# Patient Record
Sex: FEMALE | Race: WHITE | NOT HISPANIC OR LATINO | Employment: OTHER | URBAN - METROPOLITAN AREA
[De-identification: names, ages, dates, MRNs, and addresses within clinical notes are randomized per-mention and may not be internally consistent; named-entity substitution may affect disease eponyms.]

---

## 2017-04-10 DIAGNOSIS — M81.0 OSTEOPOROSIS: ICD-10-CM

## 2017-04-10 DIAGNOSIS — K21.9 GASTROESOPHAGEAL REFLUX DISEASE WITHOUT ESOPHAGITIS: ICD-10-CM

## 2017-04-10 DIAGNOSIS — I10 ESSENTIAL HYPERTENSION WITH GOAL BLOOD PRESSURE LESS THAN 130/80: ICD-10-CM

## 2017-04-10 DIAGNOSIS — E78.2 MIXED HYPERLIPIDEMIA: ICD-10-CM

## 2017-04-10 RX ORDER — LISINOPRIL 30 MG/1
30 TABLET ORAL DAILY
Qty: 90 TABLET | Refills: 0 | Status: SHIPPED | OUTPATIENT
Start: 2017-04-10 | End: 2017-07-05

## 2017-04-10 RX ORDER — ALENDRONATE SODIUM 70 MG/1
70 TABLET ORAL
Qty: 13 TABLET | Refills: 0 | Status: SHIPPED | OUTPATIENT
Start: 2017-04-10 | End: 2017-07-11

## 2017-04-10 RX ORDER — ROSUVASTATIN CALCIUM 10 MG/1
10 TABLET, COATED ORAL DAILY
Qty: 90 TABLET | Refills: 0 | Status: SHIPPED | OUTPATIENT
Start: 2017-04-10 | End: 2017-06-23

## 2017-04-10 NOTE — TELEPHONE ENCOUNTER
raloxifene    Last Written Prescription Date: 6/4/16  Last Fill Quantity: 90, # refills: 3  Last Office Visit with [facility] or [facility] prescribing provider: 6/4/16       DEXA Scan:  Last order of DX HIP/PELVIS/SPINE was found on 6/17/2015 from [facility]ointment on 6/17/2015     No order of DX HIP/PELVIS/SPINE W LAT FRACTION ANALYSIS is found.       Creatinine   Date Value Ref Range Status   05/14/2016 0.75 0.52 - 1.04 mg/dL Final     DEXA done 6/17/15    omeprazole      Last Written Prescription Date: SAB  Last Fill Quantity: 90,  # refills: 3   Last Office Visit with [facility] or [facility] prescribing provider: EMILY                                                 lisinopril      UA to refill based on low BP reading, routed to covering provider    Potassium   Date Value Ref Range Status   05/14/2016 4.5 3.4 - 5.3 mmol/L Final     Creatinine   Date Value Ref Range Status   05/14/2016 0.75 0.52 - 1.04 mg/dL Final     BP Readings from Last 3 Encounters:   10/31/16 (!) 88/60   10/21/16 120/74   10/19/16 112/76     alendronate    Last Written Prescription Date: 12/14/16  Last Fill Quantity: 13, # refills: 3  Last Office Visit with [facility] or [facility] prescribing provider: [facility]       DEXA Scan:  Last order of DX HIP/PELVIS/SPINE was found on 6/17/2015 from [facility]ointment on 6/17/2015     No order of DX HIP/PELVIS/SPINE W LAT FRACTION ANALYSIS is found.       Creatinine   Date Value Ref Range Status   05/14/2016 0.75 0.52 - 1.04 mg/dL Final       Refill done per RN refill protocol  Yohannes Garcia, RN, BSN

## 2017-04-14 RX ORDER — RALOXIFENE HYDROCHLORIDE 60 MG/1
60 TABLET, FILM COATED ORAL DAILY
Qty: 90 TABLET | Refills: 3 | Status: SHIPPED | OUTPATIENT
Start: 2017-04-14 | End: 2017-07-11

## 2017-04-17 ENCOUNTER — TELEPHONE (OUTPATIENT)
Dept: OTHER | Facility: CLINIC | Age: 72
End: 2017-04-17

## 2017-04-17 NOTE — TELEPHONE ENCOUNTER
Patient called and states she gets Crestor form a Rx drug card, do not send to [facility],   She also wants to know if she still needs a mammogram every year  Please advise  Yohannes Garcia, RN, BSN

## 2017-05-07 ENCOUNTER — OFFICE VISIT (OUTPATIENT)
Dept: URGENT CARE | Facility: URGENT CARE | Age: 72
End: 2017-05-07
Payer: COMMERCIAL

## 2017-05-07 VITALS
DIASTOLIC BLOOD PRESSURE: 70 MMHG | OXYGEN SATURATION: 100 % | WEIGHT: 179 LBS | HEART RATE: 79 BPM | TEMPERATURE: 97.9 F | BODY MASS INDEX: 30.73 KG/M2 | SYSTOLIC BLOOD PRESSURE: 116 MMHG

## 2017-05-07 DIAGNOSIS — J01.90 ACUTE SINUSITIS WITH SYMPTOMS > 10 DAYS: Primary | ICD-10-CM

## 2017-05-07 DIAGNOSIS — R09.81 CONGESTION OF PARANASAL SINUS: ICD-10-CM

## 2017-05-07 PROCEDURE — 99213 OFFICE O/P EST LOW 20 MIN: CPT | Performed by: PHYSICIAN ASSISTANT

## 2017-05-07 RX ORDER — AZITHROMYCIN 250 MG/1
TABLET, FILM COATED ORAL
Qty: 6 TABLET | Refills: 1 | Status: SHIPPED | OUTPATIENT
Start: 2017-05-07 | End: 2017-07-11

## 2017-05-07 NOTE — PROGRESS NOTES
SUBJECTIVE:  Zahra Pulido is a 71 year old female here with concerns about sinus infection.  She states onset of symptoms were 2 week(s) ago.  She has not had maxillary, frontal pressure. Course of illness is worsening. Severity moderate  Current and Associated symptoms: nasal congestion, rhinorrhea, facial pain/pressure and post-nasal drainage  Predisposing factors include recent illness. Recent treatment has included: OTC meds    Past Medical History:   Diagnosis Date     Esophageal reflux      Long term (current) use of anticoagulants      Need for prophylactic hormone replacement therapy (postmenopausal)     EVISTA     Phlebitis and thrombophlebitis of other deep vessels of lower extremities 2005     Rosacea      Unspecified essential hypertension      Social History   Substance Use Topics     Smoking status: Former Smoker     Packs/day: 1.50     Years: 4.00     Types: Cigarettes     Quit date: 8/17/1968     Smokeless tobacco: Never Used      Comment: quit 35 years ago     Alcohol use 0.0 oz/week     0 Standard drinks or equivalent per week      Comment: beer on the weekend       ROS:  CONSTITUTIONAL:NEGATIVE for fever, chills, change in weight  INTEGUMENTARY/SKIN: NEGATIVE for worrisome rashes, moles or lesions  ENT/MOUTH: POSITIVE for sinus congestion, sinus pain  RESP:NEGATIVE for significant cough or SOB  CV: NEGATIVE for chest pain, palpitations or peripheral edema  MUSCULOSKELETAL: NEGATIVE for significant arthralgias or myalgia  NEURO: NEGATIVE for weakness, dizziness or paresthesias    OBJECTIVE:  /70 (BP Location: Left arm, Patient Position: Chair, Cuff Size: Adult Regular)  Pulse 79  Temp 97.9  F (36.6  C) (Oral)  Wt 179 lb (81.2 kg)  SpO2 100%  BMI 30.73 kg/m2  Exam:GENERAL APPEARANCE: healthy, alert and no distress  EYES: EOMI,  PERRL, conjunctiva clear  HENT: TM's normal bilaterally, nasal turbinates erythematous, swollen, rhinorrhea purulent, frontal sinus tenderness  and maxillary  sinus tenderness   NECK: supple, nontender, no lymphadenopathy  RESP: lungs clear to auscultation - no rales, rhonchi or wheezes  CV: regular rates and rhythm, normal S1 S2, no murmur noted  NEURO: Normal strength and tone, sensory exam grossly normal,  normal speech and mentation  SKIN: no suspicious lesions or rashes    ASSESSMENT/PLAN:      ICD-10-CM    1. Acute sinusitis with symptoms > 10 days J01.90 azithromycin (ZITHROMAX) 250 MG tablet   2. Congestion of paranasal sinus R09.81 azithromycin (ZITHROMAX) 250 MG tablet       Fluids, rest  Steam  Follow up with primary clinic if not improving

## 2017-05-07 NOTE — NURSING NOTE
"Chief Complaint   Patient presents with     Sinus Problem     sinus pressure,nasal drainage for past 3-4 wks       Initial /70 (BP Location: Left arm, Patient Position: Chair, Cuff Size: Adult Regular)  Pulse 79  Temp 97.9  F (36.6  C) (Oral)  Wt 179 lb (81.2 kg)  SpO2 100%  BMI 30.73 kg/m2 Estimated body mass index is 30.73 kg/(m^2) as calculated from the following:    Height as of 4/17/15: 5' 4\" (1.626 m).    Weight as of this encounter: 179 lb (81.2 kg).  Medication Reconciliation: complete   Ha INTERIANO    "

## 2017-05-07 NOTE — MR AVS SNAPSHOT
After Visit Summary   5/7/2017    Zahra Pulido    MRN: [id number]           Patient Information     Date Of Birth          1945        Visit Information        Provider Department      5/7/2017 10:15 AM Tyler Kuhn PA-C [facility]        Today's Diagnoses     Acute sinusitis with symptoms > 10 days    -  1    Congestion of paranasal sinus           Follow-ups after your visit        Who to contact     If you have questions or need follow up information about today's clinic visit or your schedule please contact [facility] directly at [phone].  Normal or non-critical lab and imaging results will be communicated to you by Glympsehart, letter or phone within 4 business days after the clinic has received the results. If you do not hear from us within 7 days, please contact the clinic through Glympsehart or phone. If you have a critical or abnormal lab result, we will notify you by phone as soon as possible.  Submit refill requests through SumZero or call your pharmacy and they will forward the refill request to us. Please allow 3 business days for your refill to be completed.          Additional Information About Your Visit        MyChart Information     SumZero gives you secure access to your electronic health record. If you see a primary care provider, you can also send messages to your care team and make appointments. If you have questions, please call your primary care clinic.  If you do not have a primary care provider, please call [phone] and they will assist you.        Care EveryWhere ID     This is your Care EveryWhere ID. This could be used by other organizations to access your [facility] medical records  [id number]        Your Vitals Were     Pulse Temperature Pulse Oximetry BMI (Body Mass Index)          79 97.9  F (36.6  C) (Oral) 100% 30.73 kg/m2         Blood Pressure from Last 3 Encounters:   05/07/17 116/70    10/31/16 (!) 88/60   10/21/16 120/74    Weight from Last 3 Encounters:   05/07/17 179 lb (81.2 kg)   10/31/16 179 lb (81.2 kg)   10/21/16 186 lb (84.4 kg)              Today, you had the following     No orders found for display         Today's Medication Changes          These changes are accurate as of: 5/7/17 10:46 AM.  If you have any questions, ask your nurse or doctor.               Start taking these medicines.        Dose/Directions    azithromycin 250 MG tablet   Commonly known as:  ZITHROMAX   Used for:  Acute sinusitis with symptoms > 10 days, Congestion of paranasal sinus   Started by:  Tyler Kuhn PA-C        2 tabs po qd day 1, then 1 tab po qd days 2-5   Quantity:  6 tablet   Refills:  1            Where to get your medicines      These medications were sent to [facility]/pharmacy #[id number] [place], [place] - [address]  [address] [address]     Phone:  [phone]     azithromycin 250 MG tablet                Primary Care Provider Office Phone # Fax #    Scott Webster [phone]       [facility] [address]  [place] [id number]        Thank you!     Thank you for choosing [facility]  for your care. Our goal is always to provide you with excellent care. Hearing back from our patients is one way we can continue to improve our services. Please take a few minutes to complete the written survey that you may receive in the mail after your visit with us. Thank you!             Your Updated Medication List - Protect others around you: Learn how to safely use, store and throw away your medicines at www.disposemymeds.org.          This list is accurate as of: 5/7/17 10:46 AM.  Always use your most recent med list.                   Brand Name Dispense Instructions for use    alendronate 70 MG tablet    FOSAMAX    13 tablet    Take 1 tablet (70 mg) by mouth every 7 days       azithromycin 250 MG tablet     ZITHROMAX    6 tablet    2 tabs po qd day 1, then 1 tab po qd days 2-5       carbamide peroxide 6.5 % otic solution    DEBROX    30 mL    Place 5-10 drops into both ears 2 times daily       fluticasone 50 MCG/ACT spray    FLONASE    1 Package    Spray 1-2 sprays into both nostrils daily.       lisinopril 30 MG tablet    PRINIVIL,ZESTRIL    90 tablet    Take 1 tablet (30 mg) by mouth daily       loratadine 10 MG tablet    CLARITIN    90 tablet    Take 1 tablet (10 mg) by mouth daily       omeprazole 20 MG CR capsule    priLOSEC    90 capsule    Take 1 capsule (20 mg) by mouth daily       raloxifene 60 MG tablet    EVISTA    90 tablet    Take 1 tablet (60 mg) by mouth daily       rosuvastatin 10 MG tablet    CRESTOR    90 tablet    Take 1 tablet (10 mg) by mouth daily       valACYclovir 1000 mg tablet    VALTREX    21 tablet    Take 1 tablet (1,000 mg) by mouth 3 times daily

## 2017-06-17 ENCOUNTER — HEALTH MAINTENANCE LETTER (OUTPATIENT)
Age: 72
End: 2017-06-17

## 2017-06-23 DIAGNOSIS — E78.2 MIXED HYPERLIPIDEMIA: ICD-10-CM

## 2017-06-23 RX ORDER — ROSUVASTATIN CALCIUM 10 MG/1
10 TABLET, COATED ORAL DAILY
Qty: 90 TABLET | Refills: 0 | Status: SHIPPED | OUTPATIENT
Start: 2017-06-23 | End: 2017-07-11

## 2017-06-23 NOTE — TELEPHONE ENCOUNTER
rosuvastatin     Last Written Prescription Date: 4/10/17  Last Fill Quantity: 90, # refills: 0  Last Office Visit with [facility] or [facility] prescribing provider: 6/14/16  Next 5 appointments (look out 90 days)     Jul 11, 2017  1:00 PM CDT   Return Visit with Scott Webster MD   [facility] ([facility])    [address]  [facility] [address]   [phone]                   Lab Results   Component Value Date    CHOL 156 05/14/2016     Lab Results   Component Value Date    HDL 71 05/14/2016     Lab Results   Component Value Date    LDL 73 05/14/2016     Lab Results   Component Value Date    TRIG 62 05/14/2016     Lab Results   Component Value Date    CHOLHDLRATIO 2.0 04/11/2015     Refill done per RN refill protocol  Yohannes Garcia, RN, BSN

## 2017-07-01 DIAGNOSIS — K21.9 GASTROESOPHAGEAL REFLUX DISEASE WITHOUT ESOPHAGITIS: ICD-10-CM

## 2017-07-01 DIAGNOSIS — E78.2 MIXED HYPERLIPIDEMIA: ICD-10-CM

## 2017-07-01 LAB
ALBUMIN SERPL-MCNC: 3.4 G/DL (ref 3.4–5)
ALP SERPL-CCNC: 93 U/L (ref 40–150)
ALT SERPL W P-5'-P-CCNC: 16 U/L (ref 0–50)
ANION GAP SERPL CALCULATED.3IONS-SCNC: 7 MMOL/L (ref 3–14)
AST SERPL W P-5'-P-CCNC: 18 U/L (ref 0–45)
BASOPHILS # BLD AUTO: 0 10E9/L (ref 0–0.2)
BASOPHILS NFR BLD AUTO: 0.5 %
BILIRUB DIRECT SERPL-MCNC: 0.1 MG/DL (ref 0–0.2)
BILIRUB SERPL-MCNC: 0.4 MG/DL (ref 0.2–1.3)
BUN SERPL-MCNC: 11 MG/DL (ref 7–30)
CALCIUM SERPL-MCNC: 9 MG/DL (ref 8.5–10.1)
CHLORIDE SERPL-SCNC: 109 MMOL/L (ref 94–109)
CHOLEST SERPL-MCNC: 152 MG/DL
CO2 SERPL-SCNC: 25 MMOL/L (ref 20–32)
CREAT SERPL-MCNC: 0.73 MG/DL (ref 0.52–1.04)
DIFFERENTIAL METHOD BLD: ABNORMAL
EOSINOPHIL # BLD AUTO: 0.1 10E9/L (ref 0–0.7)
EOSINOPHIL NFR BLD AUTO: 1.4 %
ERYTHROCYTE [DISTWIDTH] IN BLOOD BY AUTOMATED COUNT: 15.2 % (ref 10–15)
GFR SERPL CREATININE-BSD FRML MDRD: 78 ML/MIN/1.7M2
GLUCOSE SERPL-MCNC: 87 MG/DL (ref 70–99)
HBA1C MFR BLD: 5.5 % (ref 4.3–6)
HCT VFR BLD AUTO: 38.2 % (ref 35–47)
HDLC SERPL-MCNC: 74 MG/DL
HGB BLD-MCNC: 12.1 G/DL (ref 11.7–15.7)
LDLC SERPL CALC-MCNC: 66 MG/DL
LYMPHOCYTES # BLD AUTO: 1.7 10E9/L (ref 0.8–5.3)
LYMPHOCYTES NFR BLD AUTO: 28.9 %
MCH RBC QN AUTO: 28.3 PG (ref 26.5–33)
MCHC RBC AUTO-ENTMCNC: 31.7 G/DL (ref 31.5–36.5)
MCV RBC AUTO: 90 FL (ref 78–100)
MONOCYTES # BLD AUTO: 0.5 10E9/L (ref 0–1.3)
MONOCYTES NFR BLD AUTO: 7.8 %
NEUTROPHILS # BLD AUTO: 3.5 10E9/L (ref 1.6–8.3)
NEUTROPHILS NFR BLD AUTO: 61.4 %
NONHDLC SERPL-MCNC: 78 MG/DL
PLATELET # BLD AUTO: 212 10E9/L (ref 150–450)
POTASSIUM SERPL-SCNC: 4.6 MMOL/L (ref 3.4–5.3)
PROT SERPL-MCNC: 7.2 G/DL (ref 6.8–8.8)
RBC # BLD AUTO: 4.27 10E12/L (ref 3.8–5.2)
SODIUM SERPL-SCNC: 141 MMOL/L (ref 133–144)
T4 FREE SERPL-MCNC: 1.05 NG/DL (ref 0.76–1.46)
TRIGL SERPL-MCNC: 58 MG/DL
TSH SERPL DL<=0.05 MIU/L-ACNC: 1.8 MU/L (ref 0.4–4)
WBC # BLD AUTO: 5.8 10E9/L (ref 4–11)

## 2017-07-01 PROCEDURE — 84443 ASSAY THYROID STIM HORMONE: CPT | Performed by: INTERNAL MEDICINE

## 2017-07-01 PROCEDURE — 80061 LIPID PANEL: CPT | Performed by: INTERNAL MEDICINE

## 2017-07-01 PROCEDURE — 80048 BASIC METABOLIC PNL TOTAL CA: CPT | Performed by: INTERNAL MEDICINE

## 2017-07-01 PROCEDURE — 80076 HEPATIC FUNCTION PANEL: CPT | Performed by: INTERNAL MEDICINE

## 2017-07-01 PROCEDURE — 83036 HEMOGLOBIN GLYCOSYLATED A1C: CPT | Performed by: INTERNAL MEDICINE

## 2017-07-01 PROCEDURE — 85025 COMPLETE CBC W/AUTO DIFF WBC: CPT | Performed by: INTERNAL MEDICINE

## 2017-07-01 PROCEDURE — 36415 COLL VENOUS BLD VENIPUNCTURE: CPT | Performed by: INTERNAL MEDICINE

## 2017-07-01 PROCEDURE — 84439 ASSAY OF FREE THYROXINE: CPT | Performed by: INTERNAL MEDICINE

## 2017-07-05 DIAGNOSIS — I10 ESSENTIAL HYPERTENSION WITH GOAL BLOOD PRESSURE LESS THAN 130/80: ICD-10-CM

## 2017-07-05 RX ORDER — LISINOPRIL 30 MG/1
30 TABLET ORAL DAILY
Qty: 90 TABLET | Refills: 0 | Status: SHIPPED | OUTPATIENT
Start: 2017-07-05 | End: 2017-07-11

## 2017-07-05 NOTE — TELEPHONE ENCOUNTER
Lisinopril;      Last Written Prescription Date: 4/10/17  Last Fill Quantity: 90, # refills: 0  Last Office Visit with [facility], [facility] or [facility] prescribing provider: 6/14/16  Next 5 appointments (look out 90 days)     Jul 11, 2017  1:00 PM CDT   Return Visit with Scott Webster MD   [facility] ([facility])    [address]  [facility] [id number] [phone]                   Potassium   Date Value Ref Range Status   07/01/2017 4.6 3.4 - 5.3 mmol/L Final     Creatinine   Date Value Ref Range Status   07/01/2017 0.73 0.52 - 1.04 mg/dL Final     BP Readings from Last 3 Encounters:   05/07/17 116/70   10/31/16 (!) 88/60   10/21/16 120/74     Refill done per RN refill protocol  Yohannes Garcia, RN, BSN

## 2017-07-08 ENCOUNTER — HEALTH MAINTENANCE LETTER (OUTPATIENT)
Age: 72
End: 2017-07-08

## 2017-07-11 ENCOUNTER — OFFICE VISIT (OUTPATIENT)
Dept: OTHER | Facility: CLINIC | Age: 72
End: 2017-07-11
Attending: INTERNAL MEDICINE
Payer: COMMERCIAL

## 2017-07-11 VITALS
OXYGEN SATURATION: 98 % | BODY MASS INDEX: 30.48 KG/M2 | SYSTOLIC BLOOD PRESSURE: 123 MMHG | DIASTOLIC BLOOD PRESSURE: 66 MMHG | WEIGHT: 177.6 LBS | HEART RATE: 80 BPM

## 2017-07-11 DIAGNOSIS — I10 ESSENTIAL HYPERTENSION WITH GOAL BLOOD PRESSURE LESS THAN 130/80: ICD-10-CM

## 2017-07-11 DIAGNOSIS — E78.2 MIXED HYPERLIPIDEMIA: ICD-10-CM

## 2017-07-11 DIAGNOSIS — B02.9 HERPES ZOSTER WITHOUT COMPLICATION: ICD-10-CM

## 2017-07-11 DIAGNOSIS — K21.9 GASTROESOPHAGEAL REFLUX DISEASE WITHOUT ESOPHAGITIS: ICD-10-CM

## 2017-07-11 DIAGNOSIS — Z00.00 MEDICARE ANNUAL WELLNESS VISIT, SUBSEQUENT: Primary | ICD-10-CM

## 2017-07-11 DIAGNOSIS — J30.2 CHRONIC SEASONAL ALLERGIC RHINITIS DUE TO OTHER ALLERGEN: ICD-10-CM

## 2017-07-11 DIAGNOSIS — M81.8 OTHER OSTEOPOROSIS WITHOUT CURRENT PATHOLOGICAL FRACTURE: ICD-10-CM

## 2017-07-11 PROCEDURE — 99211 OFF/OP EST MAY X REQ PHY/QHP: CPT

## 2017-07-11 PROCEDURE — G0439 PPPS, SUBSEQ VISIT: HCPCS | Mod: ZP | Performed by: INTERNAL MEDICINE

## 2017-07-11 PROCEDURE — 99213 OFFICE O/P EST LOW 20 MIN: CPT | Mod: ZP | Performed by: INTERNAL MEDICINE

## 2017-07-11 RX ORDER — ROSUVASTATIN CALCIUM 10 MG/1
10 TABLET, COATED ORAL DAILY
Qty: 90 TABLET | Refills: 0 | Status: CANCELLED | OUTPATIENT
Start: 2017-07-11

## 2017-07-11 RX ORDER — RALOXIFENE HYDROCHLORIDE 60 MG/1
60 TABLET, FILM COATED ORAL DAILY
Qty: 90 TABLET | Refills: 3 | Status: CANCELLED | OUTPATIENT
Start: 2017-07-11

## 2017-07-11 RX ORDER — ALENDRONATE SODIUM 70 MG/1
70 TABLET ORAL
Qty: 13 TABLET | Refills: 0 | Status: CANCELLED | OUTPATIENT
Start: 2017-07-11

## 2017-07-11 RX ORDER — ROSUVASTATIN CALCIUM 10 MG/1
10 TABLET, COATED ORAL DAILY
Qty: 90 TABLET | Refills: 3 | Status: SHIPPED | OUTPATIENT
Start: 2017-07-11 | End: 2017-09-27

## 2017-07-11 RX ORDER — LISINOPRIL 30 MG/1
30 TABLET ORAL DAILY
Qty: 90 TABLET | Refills: 3 | Status: SHIPPED | OUTPATIENT
Start: 2017-07-11 | End: 2017-09-27

## 2017-07-11 RX ORDER — LISINOPRIL 30 MG/1
30 TABLET ORAL DAILY
Qty: 90 TABLET | Refills: 0 | Status: CANCELLED | OUTPATIENT
Start: 2017-07-11

## 2017-07-11 RX ORDER — LISINOPRIL 30 MG/1
30 TABLET ORAL DAILY
Qty: 90 TABLET | Refills: 3 | Status: CANCELLED | OUTPATIENT
Start: 2017-07-11

## 2017-07-11 RX ORDER — LORATADINE 10 MG/1
10 TABLET ORAL DAILY
Qty: 90 TABLET | Refills: 3 | Status: SHIPPED | OUTPATIENT
Start: 2017-07-11 | End: 2018-09-17

## 2017-07-11 RX ORDER — RALOXIFENE HYDROCHLORIDE 60 MG/1
60 TABLET, FILM COATED ORAL DAILY
Qty: 90 TABLET | Refills: 3 | Status: SHIPPED | OUTPATIENT
Start: 2017-07-11 | End: 2018-09-17

## 2017-07-11 RX ORDER — FLUTICASONE PROPIONATE 50 MCG
1-2 SPRAY, SUSPENSION (ML) NASAL DAILY
Qty: 51 G | Refills: 3 | Status: SHIPPED | OUTPATIENT
Start: 2017-07-11 | End: 2018-09-17

## 2017-07-11 RX ORDER — VALACYCLOVIR HYDROCHLORIDE 1 G/1
1000 TABLET, FILM COATED ORAL 3 TIMES DAILY
Qty: 21 TABLET | Refills: 3 | Status: SHIPPED | OUTPATIENT
Start: 2017-07-11 | End: 2017-09-27

## 2017-07-11 RX ORDER — ALENDRONATE SODIUM 70 MG/1
70 TABLET ORAL
Qty: 13 TABLET | Refills: 3 | Status: SHIPPED | OUTPATIENT
Start: 2017-07-11 | End: 2017-09-27
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
## 2017-07-11 NOTE — MR AVS SNAPSHOT
After Visit Summary   7/11/2017    Zahra Pulido    MRN: [id number]           Patient Information     Date Of Birth          1945        Visit Information        Provider Department      7/11/2017 1:00 PM Scott Webster MD [facility]      Today's Diagnoses     Medicare annual wellness visit, subsequent    -  1    Mixed hyperlipidemia        Essential hypertension with goal blood pressure less than 130/80        Other osteoporosis without current pathological fracture        Gastroesophageal reflux disease without esophagitis        Herpes zoster without complication        Chronic seasonal allergic rhinitis due to other allergen           Follow-ups after your visit        Additional Services     Follow-Up with Vascular Medicine           GASTROENTEROLOGY ADULT REF PROCEDURE ONLY       Last Lab Result: Creatinine (mg/dL)       Date                     Value                 07/01/2017               0.73             ----------  Body mass index is 30.48 kg/(m^2).     Needed:  No  Language:  English    Patient will be contacted to schedule procedure.     Please be aware that coverage of these services is subject to the terms and limitations of your health insurance plan.  Call member services at your health plan with any benefit or coverage questions.  Any procedures must be performed at a [facility] facility OR coordinated by your clinic's referral office.    Please bring the following with you to your appointment:    (1) Any X-Rays, CTs or MRIs which have been performed.  Contact the facility where they were done to arrange for  prior to your scheduled appointment.    (2) List of current medications   (3) This referral request   (4) Any documents/labs given to you for this referral                  Future tests that were ordered for you today     Open Future Orders        Priority Expected Expires  Ordered    Follow-Up with Vascular Medicine Routine 7/11/2018 7/11/2018 7/11/2017    T3 Free Routine 7/11/2018 7/11/2018 7/11/2017    T4 free Routine 7/11/2018 7/11/2018 7/11/2017    TSH Routine 7/11/2018 7/11/2018 7/11/2017    Hepatic panel Routine 7/11/2018 7/11/2018 7/11/2017    Basic metabolic panel Routine 7/11/2018 7/11/2018 7/11/2017    Lipid Profile Routine 7/11/2018 7/11/2018 7/11/2017    Hemoglobin A1c Routine 7/11/2018 7/11/2018 7/11/2017    GASTROENTEROLOGY ADULT REF PROCEDURE ONLY Routine 7/11/2018 7/11/2018 7/11/2017            Who to contact     If you have questions or need follow up information about today's clinic visit or your schedule please contact [facility] directly at [phone].  Normal or non-critical lab and imaging results will be communicated to you by [employer], letter or phone within 4 business days after the clinic has received the results. If you do not hear from us within 7 days, please contact the clinic through [employer] or phone. If you have a critical or abnormal lab result, we will notify you by phone as soon as possible.  Submit refill requests through [employer] or call your pharmacy and they will forward the refill request to us. Please allow 3 business days for your refill to be completed.          Additional Information About Your Visit        [employer] Information     [facility] gives you secure access to your electronic health record. If you see a primary care provider, you can also send messages to your care team and make appointments. If you have questions, please call your primary care clinic.  If you do not have a primary care provider, please call [phone] and they will assist you.        Care EveryWhere ID     This is your Care EveryWhere ID. This could be used by other organizations to access your [place] medical records  [id number]        Your Vitals Were     Pulse Pulse Oximetry BMI (Body Mass Index)             80 98% 30.48 kg/m2           Blood Pressure from Last 3 Encounters:   07/11/17 123/66   05/07/17 116/70   10/31/16 (!) 88/60    Weight from Last 3 Encounters:   07/11/17 80.6 kg (177 lb 9.6 oz)   05/07/17 81.2 kg (179 lb)   10/31/16 81.2 kg (179 lb)              We Performed the Following     Follow-Up with Vascular Medicine     OFFICE/OUTPT VISIT,KASHMIR CHAWLA III          Where to get your medicines      These medications were sent to [facility] - [place], [place] - [address]  [address] [address]     Phone:  [phone]     alendronate 70 MG tablet    fluticasone 50 MCG/ACT spray    lisinopril 30 MG tablet    loratadine 10 MG tablet    omeprazole 20 MG CR capsule    raloxifene 60 MG tablet    rosuvastatin 10 MG tablet    valACYclovir 1000 mg tablet          Primary Care Provider Office Phone # Fax #    Scott Webster [phone]       [place] [facility] [address]  [facility] [address]        Equal Access to Services     [facility] : Hadii aad ku hadasho Sopetty, waaxda luqadaha, qaybta kaalmada rashi, jean-claude [place] . So [facility] [phone].    ATENCIÓN: Si habla español, tiene a barrera disposición servicios gratmiguel angelos de asistencia lingüística. [facility] [phone].    We comply with applicable federal civil rights laws and [place] laws. We do not discriminate on the basis of race, color, national origin, age, disability sex, sexual orientation or gender identity.            Thank you!     Thank you for choosing [facility]  for your care. Our goal is always to provide you with excellent care. Hearing back from our patients is one way we can continue to improve our services. Please take a few minutes to complete the written survey that you may receive in the mail after your visit with us. Thank you!             Your Updated Medication List - Protect others around you: Learn how to safely use, store and throw  away your medicines at www.disposemymeds.org.          This list is accurate as of: 7/11/17  1:49 PM.  Always use your most recent med list.                   Brand Name Dispense Instructions for use Diagnosis    alendronate 70 MG tablet    FOSAMAX    13 tablet    Take 1 tablet (70 mg) by mouth every 7 days    Other osteoporosis without current pathological fracture       fluticasone 50 MCG/ACT spray    FLONASE    51 g    Spray 1-2 sprays into both nostrils daily    Chronic seasonal allergic rhinitis due to other allergen       lisinopril 30 MG tablet    PRINIVIL,ZESTRIL    90 tablet    Take 1 tablet (30 mg) by mouth daily    Essential hypertension with goal blood pressure less than 130/80       loratadine 10 MG tablet    CLARITIN    90 tablet    Take 1 tablet (10 mg) by mouth daily    Chronic seasonal allergic rhinitis due to other allergen       omeprazole 20 MG CR capsule    priLOSEC    90 capsule    Take 1 capsule (20 mg) by mouth daily    Gastroesophageal reflux disease without esophagitis       raloxifene 60 MG tablet    EVISTA    90 tablet    Take 1 tablet (60 mg) by mouth daily    Other osteoporosis without current pathological fracture       rosuvastatin 10 MG tablet    CRESTOR    90 tablet    Take 1 tablet (10 mg) by mouth daily    Mixed hyperlipidemia       valACYclovir 1000 mg tablet    VALTREX    21 tablet    Take 1 tablet (1,000 mg) by mouth 3 times daily    Herpes zoster without complication

## 2017-07-11 NOTE — NURSING NOTE
"Chief Complaint   Patient presents with     RECHECK     F/U fasting labs done 07/01/17       Initial /66 (BP Location: Right arm, Patient Position: Chair, Cuff Size: Adult Large)  Pulse 80  Wt 177 lb 9.6 oz (80.6 kg)  SpO2 98%  BMI 30.48 kg/m2 Estimated body mass index is 30.48 kg/(m^2) as calculated from the following:    Height as of 4/17/15: 5' 4\" (1.626 m).    Weight as of this encounter: 177 lb 9.6 oz (80.6 kg).  Medication Reconciliation: complete     Face to Face nursing time 7 minutes     Rosalie Delgado CMA      "

## 2017-09-27 ENCOUNTER — TELEPHONE (OUTPATIENT)
Dept: OTHER | Facility: CLINIC | Age: 72
End: 2017-09-27

## 2017-09-27 DIAGNOSIS — I10 ESSENTIAL HYPERTENSION WITH GOAL BLOOD PRESSURE LESS THAN 130/80: ICD-10-CM

## 2017-09-27 DIAGNOSIS — E78.2 MIXED HYPERLIPIDEMIA: ICD-10-CM

## 2017-09-27 DIAGNOSIS — K21.9 GASTROESOPHAGEAL REFLUX DISEASE WITHOUT ESOPHAGITIS: ICD-10-CM

## 2017-09-27 DIAGNOSIS — M81.8 OTHER OSTEOPOROSIS WITHOUT CURRENT PATHOLOGICAL FRACTURE: ICD-10-CM

## 2017-09-27 DIAGNOSIS — B02.9 HERPES ZOSTER WITHOUT COMPLICATION: ICD-10-CM

## 2017-09-27 RX ORDER — VALACYCLOVIR HYDROCHLORIDE 1 G/1
1000 TABLET, FILM COATED ORAL 3 TIMES DAILY
Qty: 21 TABLET | Refills: 3 | Status: SHIPPED | OUTPATIENT
Start: 2017-09-27 | End: 2018-09-17

## 2017-09-27 RX ORDER — ROSUVASTATIN CALCIUM 10 MG/1
10 TABLET, COATED ORAL DAILY
Qty: 90 TABLET | Refills: 3 | Status: SHIPPED | OUTPATIENT
Start: 2017-09-27 | End: 2017-10-02

## 2017-09-27 RX ORDER — LISINOPRIL 30 MG/1
30 TABLET ORAL DAILY
Qty: 90 TABLET | Refills: 3 | Status: SHIPPED | OUTPATIENT
Start: 2017-09-27 | End: 2018-09-11

## 2017-09-27 RX ORDER — ALENDRONATE SODIUM 70 MG/1
70 TABLET ORAL
Qty: 13 TABLET | Refills: 3 | Status: SHIPPED | OUTPATIENT
Start: 2017-09-27 | End: 2018-09-17 | Stop reason: ALTCHOICE

## 2017-09-27 NOTE — TELEPHONE ENCOUNTER
All medications except evista and lisinopril sent to wrong pharmacy  Order corrected  Yohannes Garcia, RN, BSN

## 2017-10-02 RX ORDER — ROSUVASTATIN CALCIUM 10 MG/1
10 TABLET, FILM COATED ORAL DAILY
Qty: 90 TABLET | Refills: 3 | Status: SHIPPED | OUTPATIENT
Start: 2017-10-02 | End: 2018-09-17

## 2017-12-10 ENCOUNTER — OFFICE VISIT (OUTPATIENT)
Dept: URGENT CARE | Facility: URGENT CARE | Age: 72
End: 2017-12-10
Payer: COMMERCIAL

## 2017-12-10 VITALS
SYSTOLIC BLOOD PRESSURE: 138 MMHG | DIASTOLIC BLOOD PRESSURE: 82 MMHG | HEART RATE: 85 BPM | WEIGHT: 169.19 LBS | TEMPERATURE: 98.6 F | BODY MASS INDEX: 29.04 KG/M2

## 2017-12-10 DIAGNOSIS — J01.90 ACUTE SINUSITIS WITH COEXISTING CONDITION, NEED PROPHYLACTIC TREATMENT: Primary | ICD-10-CM

## 2017-12-10 PROCEDURE — 99213 OFFICE O/P EST LOW 20 MIN: CPT | Performed by: PHYSICIAN ASSISTANT

## 2017-12-10 RX ORDER — FLUTICASONE PROPIONATE 50 MCG
2 SPRAY, SUSPENSION (ML) NASAL DAILY
Qty: 1 BOTTLE | Refills: 0 | Status: SHIPPED | OUTPATIENT
Start: 2017-12-10 | End: 2018-09-17

## 2017-12-10 RX ORDER — SULFAMETHOXAZOLE/TRIMETHOPRIM 800-160 MG
1 TABLET ORAL 2 TIMES DAILY
Qty: 20 TABLET | Refills: 0 | Status: SHIPPED | OUTPATIENT
Start: 2017-12-10 | End: 2018-09-17

## 2017-12-10 NOTE — NURSING NOTE
"Chief Complaint   Patient presents with     Sinus Problem     sinus drainage, nasal congestion and facial pressure for a few days.        Initial /82  Pulse 85  Temp 98.6  F (37  C) (Oral)  Wt 169 lb 3 oz (76.7 kg)  BMI 29.04 kg/m2 Estimated body mass index is 29.04 kg/(m^2) as calculated from the following:    Height as of 4/17/15: 5' 4\" (1.626 m).    Weight as of this encounter: 169 lb 3 oz (76.7 kg).  Medication Reconciliation: complete    "
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
## 2017-12-10 NOTE — MR AVS SNAPSHOT
After Visit Summary   12/10/2017    Zahra Pulido    MRN: [id number]           Patient Information     Date Of Birth          1945        Visit Information        Provider Department      12/10/2017 9:20 AM Dariana Smith PA-C [facility]        Today's Diagnoses     Acute sinusitis with coexisting condition, need prophylactic treatment    -  1       Follow-ups after your visit        Who to contact     If you have questions or need follow up information about today's clinic visit or your schedule please contact [facility] directly at [phone].  Normal or non-critical lab and imaging results will be communicated to you by [email or web address], letter or phone within 4 business days after the clinic has received the results. If you do not hear from us within 7 days, please contact the clinic through [email or web address] or phone. If you have a critical or abnormal lab result, we will notify you by phone as soon as possible.  Submit refill requests through [facility] or call your pharmacy and they will forward the refill request to us. Please allow 3 business days for your refill to be completed.          Additional Information About Your Visit        MyChart Information     [facility] gives you secure access to your electronic health record. If you see a primary care provider, you can also send messages to your care team and make appointments. If you have questions, please call your primary care clinic.  If you do not have a primary care provider, please call [phone] and they will assist you.        Care EveryWhere ID     This is your Care EveryWhere ID. This could be used by other organizations to access your [place] medical records  [id number]        Your Vitals Were     Pulse Temperature BMI (Body Mass Index)             85 98.6  F (37  C) (Oral) 29.04 kg/m2          Blood Pressure from Last 3 Encounters:   12/10/17 138/82   07/11/17 123/66    05/07/17 116/70    Weight from Last 3 Encounters:   12/10/17 169 lb 3 oz (76.7 kg)   07/11/17 177 lb 9.6 oz (80.6 kg)   05/07/17 179 lb (81.2 kg)              Today, you had the following     No orders found for display         Today's Medication Changes          These changes are accurate as of: 12/10/17 10:34 AM.  If you have any questions, ask your nurse or doctor.               Start taking these medicines.        Dose/Directions    sulfamethoxazole-trimethoprim 800-160 MG per tablet   Commonly known as:  BACTRIM DS/SEPTRA DS   Used for:  Acute sinusitis with coexisting condition, need prophylactic treatment   Started by:  Dariana Smith PA-C        Dose:  1 tablet   Take 1 tablet by mouth 2 times daily   Quantity:  20 tablet   Refills:  0         These medicines have changed or have updated prescriptions.        Dose/Directions    * fluticasone 50 MCG/ACT spray   Commonly known as:  FLONASE   This may have changed:  Another medication with the same name was added. Make sure you understand how and when to take each.   Used for:  Chronic seasonal allergic rhinitis due to other allergen   Changed by:  Scott Webster MD        Dose:  1-2 spray   Spray 1-2 sprays into both nostrils daily   Quantity:  51 g   Refills:  3       * fluticasone 50 MCG/ACT spray   Commonly known as:  FLONASE   This may have changed:  You were already taking a medication with the same name, and this prescription was added. Make sure you understand how and when to take each.   Used for:  Acute sinusitis with coexisting condition, need prophylactic treatment   Changed by:  Dariana Smith PA-C        Dose:  2 spray   Spray 2 sprays into both nostrils daily   Quantity:  1 Bottle   Refills:  0       * Notice:  This list has 2 medication(s) that are the same as other medications prescribed for you. Read the directions carefully, and ask your doctor or other care provider to review them with you.         Where  to get your medicines      These medications were sent to [facility] #[id number] - [place], [place] - [address]  [address] [address]     Phone:  [phone]     fluticasone 50 MCG/ACT spray    sulfamethoxazole-trimethoprim 800-160 MG per tablet                Primary Care Provider Office Phone # Fax #    Fadyrylie Milton Webster [phone]       [place] [facility] [address]  [facility] [address]        Equal Access to Services     [facility] : Hadii aad ku hadasho Soomaali, waaxda luqadaha, qaybta kaalmada adeegyada, waxay idiin hayaan adeeg kharash lamaykel . So [facility] [phone].    ATENCIÓN: Si habla español, tiene a barrera disposición servicios gratuitos de asistencia lingüística. [facility] [phone].    We comply with applicable federal civil rights laws and [place] laws. We do not discriminate on the basis of race, color, national origin, age, disability, sex, sexual orientation, or gender identity.            Thank you!     Thank you for choosing [facility]  for your care. Our goal is always to provide you with excellent care. Hearing back from our patients is one way we can continue to improve our services. Please take a few minutes to complete the written survey that you may receive in the mail after your visit with us. Thank you!             Your Updated Medication List - Protect others around you: Learn how to safely use, store and throw away your medicines at www.disposemymeds.org.          This list is accurate as of: 12/10/17 10:34 AM.  Always use your most recent med list.                   Brand Name Dispense Instructions for use Diagnosis    alendronate 70 MG tablet    FOSAMAX    13 tablet    Take 1 tablet (70 mg) by mouth every 7 days    Other osteoporosis without current pathological fracture       CRESTOR 10 MG tablet   Generic drug:  rosuvastatin     90 tablet    Take 1 tablet (10 mg) by mouth daily    Mixed  hyperlipidemia       * fluticasone 50 MCG/ACT spray    FLONASE    51 g    Spray 1-2 sprays into both nostrils daily    Chronic seasonal allergic rhinitis due to other allergen       * fluticasone 50 MCG/ACT spray    FLONASE    1 Bottle    Spray 2 sprays into both nostrils daily    Acute sinusitis with coexisting condition, need prophylactic treatment       lisinopril 30 MG tablet    PRINIVIL,ZESTRIL    90 tablet    Take 1 tablet (30 mg) by mouth daily    Essential hypertension with goal blood pressure less than 130/80       loratadine 10 MG tablet    CLARITIN    90 tablet    Take 1 tablet (10 mg) by mouth daily    Chronic seasonal allergic rhinitis due to other allergen       omeprazole 20 MG CR capsule    priLOSEC    90 capsule    Take 1 capsule (20 mg) by mouth daily    Gastroesophageal reflux disease without esophagitis       raloxifene 60 MG tablet    EVISTA    90 tablet    Take 1 tablet (60 mg) by mouth daily    Other osteoporosis without current pathological fracture       sulfamethoxazole-trimethoprim 800-160 MG per tablet    BACTRIM DS/SEPTRA DS    20 tablet    Take 1 tablet by mouth 2 times daily    Acute sinusitis with coexisting condition, need prophylactic treatment       valACYclovir 1000 mg tablet    VALTREX    21 tablet    Take 1 tablet (1,000 mg) by mouth 3 times daily    Herpes zoster without complication       * Notice:  This list has 2 medication(s) that are the same as other medications prescribed for you. Read the directions carefully, and ask your doctor or other care provider to review them with you.

## 2018-03-12 ENCOUNTER — TELEPHONE (OUTPATIENT)
Dept: OTHER | Facility: CLINIC | Age: 73
End: 2018-03-12

## 2018-03-12 NOTE — TELEPHONE ENCOUNTER
"Pt calling to ask for assistance in getting a refill on her raloxifine.  Last rx sent to [facility] 7/11/17 for 90t/3r.      Called [facility] to see why pt is unable to get refill.  The last rx that they have on file is from 4/14/17. [facility] states that the pt canceled the 7/11/17 prescription.      Pharmacist was able to take the \"void\" off the 7/11/17 script and pt should have refill within 3-5 days.    Penny Kaiser, CLAIRN, RN       "

## 2018-05-01 ENCOUNTER — OFFICE VISIT (OUTPATIENT)
Dept: URGENT CARE | Facility: URGENT CARE | Age: 73
End: 2018-05-01
Payer: COMMERCIAL

## 2018-05-01 VITALS
RESPIRATION RATE: 16 BRPM | DIASTOLIC BLOOD PRESSURE: 78 MMHG | BODY MASS INDEX: 28.32 KG/M2 | HEART RATE: 64 BPM | SYSTOLIC BLOOD PRESSURE: 120 MMHG | TEMPERATURE: 97.8 F | WEIGHT: 165 LBS

## 2018-05-01 DIAGNOSIS — J00 ACUTE RHINITIS, UNSPECIFIED TYPE: Primary | ICD-10-CM

## 2018-05-01 DIAGNOSIS — H61.21 IMPACTED CERUMEN OF RIGHT EAR: ICD-10-CM

## 2018-05-01 PROCEDURE — 69209 REMOVE IMPACTED EAR WAX UNI: CPT | Performed by: PHYSICIAN ASSISTANT

## 2018-05-01 PROCEDURE — 99213 OFFICE O/P EST LOW 20 MIN: CPT | Mod: 25 | Performed by: PHYSICIAN ASSISTANT

## 2018-05-01 RX ORDER — LORATADINE 10 MG/1
10 TABLET ORAL DAILY
Qty: 30 TABLET | Refills: 1 | Status: SHIPPED | OUTPATIENT
Start: 2018-05-01 | End: 2018-09-17

## 2018-05-01 NOTE — NURSING NOTE
"Chief Complaint   Patient presents with     Ear Problem     both ears feel plugged       Initial /78  Pulse 64  Temp 97.8  F (36.6  C) (Oral)  Resp 16  Wt 165 lb (74.8 kg)  BMI 28.32 kg/m2 Estimated body mass index is 28.32 kg/(m^2) as calculated from the following:    Height as of 4/17/15: 5' 4\" (1.626 m).    Weight as of this encounter: 165 lb (74.8 kg).  Medication Reconciliation: unable or not appropriate to perform    Altaf Robison CMA  "

## 2018-05-01 NOTE — MR AVS SNAPSHOT
After Visit Summary   5/1/2018    Zahra Pulido    MRN: [id number]           Patient Information     Date Of Birth          1945        Visit Information        Provider Department      5/1/2018 2:45 PM Dariana Smith PA-C [facility]        Today's Diagnoses     Acute rhinitis, unspecified type    -  1    Impacted cerumen of right ear           Follow-ups after your visit        Who to contact     If you have questions or need follow up information about today's clinic visit or your schedule please contact [place] [facility] directly at [phone].  Normal or non-critical lab and imaging results will be communicated to you by MyChart, letter or phone within 4 business days after the clinic has received the results. If you do not hear from us within 7 days, please contact the clinic through [employer] or phone. If you have a critical or abnormal lab result, we will notify you by phone as soon as possible.  Submit refill requests through [employer] or call your pharmacy and they will forward the refill request to us. Please allow 3 business days for your refill to be completed.          Additional Information About Your Visit        MyChart Information     [employer] gives you secure access to your electronic health record. If you see a primary care provider, you can also send messages to your care team and make appointments. If you have questions, please call your primary care clinic.  If you do not have a primary care provider, please call [phone] and they will assist you.        Care EveryWhere ID     This is your Care EveryWhere ID. This could be used by other organizations to access your [place] medical records  [id number]        Your Vitals Were     Pulse Temperature Respirations BMI (Body Mass Index)          64 97.8  F (36.6  C) (Oral) 16 28.32 kg/m2         Blood Pressure from Last 3 Encounters:   05/01/18 120/78   12/10/17  138/82   07/11/17 123/66    Weight from Last 3 Encounters:   05/01/18 165 lb (74.8 kg)   12/10/17 169 lb 3 oz (76.7 kg)   07/11/17 177 lb 9.6 oz (80.6 kg)              We Performed the Following     HC REMOVAL IMPACTED CERUMEN IRRIGATION/LVG UNILAT          Today's Medication Changes          These changes are accurate as of 5/1/18  4:02 PM.  If you have any questions, ask your nurse or doctor.               These medicines have changed or have updated prescriptions.        Dose/Directions    * loratadine 10 MG tablet   Commonly known as:  CLARITIN   This may have changed:  Another medication with the same name was added. Make sure you understand how and when to take each.   Used for:  Chronic seasonal allergic rhinitis due to other allergen   Changed by:  Dariana Smith PA-C        Dose:  10 mg   Take 1 tablet (10 mg) by mouth daily   Quantity:  90 tablet   Refills:  3       * loratadine 10 MG tablet   Commonly known as:  CLARITIN   This may have changed:  You were already taking a medication with the same name, and this prescription was added. Make sure you understand how and when to take each.   Used for:  Acute rhinitis, unspecified type   Changed by:  Dariana Smith PA-C        Dose:  10 mg   Take 1 tablet (10 mg) by mouth daily   Quantity:  30 tablet   Refills:  1       * Notice:  This list has 2 medication(s) that are the same as other medications prescribed for you. Read the directions carefully, and ask your doctor or other care provider to review them with you.         Where to get your medicines      These medications were sent to [facility]/pharmacy #[id number] [place] [id number] [address]     Phone:  [phone]     loratadine 10 MG tablet                Primary Care Provider Office Phone # Fax #    Scott Webster [phone]       [facility] [address] [facility]  [place] [address]        Equal Access to  Services     [facility]: Hadii aad ku hadnimishasilver Mandypetty, waaxda luqadaha, qaybta kaalmada rashi, jean-claude [place] . So [facility] [phone].    ATENCIÓN: Si trupti almaguer, tiene a barrera disposición servicios gratuitos de asistencia lingüística. Llame al [phone].    We comply with applicable federal civil rights laws and [place] laws. We do not discriminate on the basis of race, color, national origin, age, disability, sex, sexual orientation, or gender identity.            Thank you!     Thank you for choosing [place] [facility]  for your care. Our goal is always to provide you with excellent care. Hearing back from our patients is one way we can continue to improve our services. Please take a few minutes to complete the written survey that you may receive in the mail after your visit with us. Thank you!             Your Updated Medication List - Protect others around you: Learn how to safely use, store and throw away your medicines at www.disposemymeds.org.          This list is accurate as of 5/1/18  4:02 PM.  Always use your most recent med list.                   Brand Name Dispense Instructions for use Diagnosis    alendronate 70 MG tablet    FOSAMAX    13 tablet    Take 1 tablet (70 mg) by mouth every 7 days    Other osteoporosis without current pathological fracture       CRESTOR 10 MG tablet   Generic drug:  rosuvastatin     90 tablet    Take 1 tablet (10 mg) by mouth daily    Mixed hyperlipidemia       * fluticasone 50 MCG/ACT spray    FLONASE    51 g    Spray 1-2 sprays into both nostrils daily    Chronic seasonal allergic rhinitis due to other allergen       * fluticasone 50 MCG/ACT spray    FLONASE    1 Bottle    Spray 2 sprays into both nostrils daily    Acute sinusitis with coexisting condition, need prophylactic treatment       lisinopril 30 MG tablet    PRINIVIL,ZESTRIL    90 tablet    Take 1 tablet (30 mg) by mouth daily    Essential hypertension  with goal blood pressure less than 130/80       * loratadine 10 MG tablet    CLARITIN    90 tablet    Take 1 tablet (10 mg) by mouth daily    Chronic seasonal allergic rhinitis due to other allergen       * loratadine 10 MG tablet    CLARITIN    30 tablet    Take 1 tablet (10 mg) by mouth daily    Acute rhinitis, unspecified type       omeprazole 20 MG CR capsule    priLOSEC    90 capsule    Take 1 capsule (20 mg) by mouth daily    Gastroesophageal reflux disease without esophagitis       raloxifene 60 MG tablet    EVISTA    90 tablet    Take 1 tablet (60 mg) by mouth daily    Other osteoporosis without current pathological fracture       sulfamethoxazole-trimethoprim 800-160 MG per tablet    BACTRIM DS/SEPTRA DS    20 tablet    Take 1 tablet by mouth 2 times daily    Acute sinusitis with coexisting condition, need prophylactic treatment       valACYclovir 1000 mg tablet    VALTREX    21 tablet    Take 1 tablet (1,000 mg) by mouth 3 times daily    Herpes zoster without complication       * Notice:  This list has 4 medication(s) that are the same as other medications prescribed for you. Read the directions carefully, and ask your doctor or other care provider to review them with you.

## 2018-05-17 ENCOUNTER — TELEPHONE (OUTPATIENT)
Dept: OTHER | Facility: CLINIC | Age: 73
End: 2018-05-17

## 2018-05-17 NOTE — TELEPHONE ENCOUNTER
Patient calls and states that she knows she is due to see Dr. Webster in July.  She is however having tooth extractions and multiple dental surgeies over the summer.  She will call in September to schedule labs and appointment with Dr. Webster.  Yohannes Garcia, RN, BSN

## 2018-06-23 ENCOUNTER — HEALTH MAINTENANCE LETTER (OUTPATIENT)
Age: 73
End: 2018-06-23

## 2018-07-14 ENCOUNTER — HEALTH MAINTENANCE LETTER (OUTPATIENT)
Age: 73
End: 2018-07-14

## 2018-09-08 DIAGNOSIS — E78.2 MIXED HYPERLIPIDEMIA: ICD-10-CM

## 2018-09-08 DIAGNOSIS — I10 ESSENTIAL HYPERTENSION WITH GOAL BLOOD PRESSURE LESS THAN 130/80: ICD-10-CM

## 2018-09-08 LAB
ALBUMIN SERPL-MCNC: 3.3 G/DL (ref 3.4–5)
ALP SERPL-CCNC: 82 U/L (ref 40–150)
ALT SERPL W P-5'-P-CCNC: 14 U/L (ref 0–50)
ANION GAP SERPL CALCULATED.3IONS-SCNC: 7 MMOL/L (ref 3–14)
AST SERPL W P-5'-P-CCNC: 19 U/L (ref 0–45)
BILIRUB DIRECT SERPL-MCNC: 0.1 MG/DL (ref 0–0.2)
BILIRUB SERPL-MCNC: 0.3 MG/DL (ref 0.2–1.3)
BUN SERPL-MCNC: 9 MG/DL (ref 7–30)
CALCIUM SERPL-MCNC: 8.7 MG/DL (ref 8.5–10.1)
CHLORIDE SERPL-SCNC: 107 MMOL/L (ref 94–109)
CHOLEST SERPL-MCNC: 134 MG/DL
CO2 SERPL-SCNC: 27 MMOL/L (ref 20–32)
CREAT SERPL-MCNC: 0.82 MG/DL (ref 0.52–1.04)
GFR SERPL CREATININE-BSD FRML MDRD: 69 ML/MIN/1.7M2
GLUCOSE SERPL-MCNC: 91 MG/DL (ref 70–99)
HBA1C MFR BLD: 5.4 % (ref 0–5.6)
HDLC SERPL-MCNC: 67 MG/DL
LDLC SERPL CALC-MCNC: 55 MG/DL
NONHDLC SERPL-MCNC: 67 MG/DL
POTASSIUM SERPL-SCNC: 4.6 MMOL/L (ref 3.4–5.3)
PROT SERPL-MCNC: 7.5 G/DL (ref 6.8–8.8)
SODIUM SERPL-SCNC: 141 MMOL/L (ref 133–144)
T3FREE SERPL-MCNC: 2.7 PG/ML (ref 2.3–4.2)
T4 FREE SERPL-MCNC: 1.16 NG/DL (ref 0.76–1.46)
TRIGL SERPL-MCNC: 58 MG/DL
TSH SERPL DL<=0.005 MIU/L-ACNC: 1.28 MU/L (ref 0.4–4)

## 2018-09-08 PROCEDURE — 84481 FREE ASSAY (FT-3): CPT | Performed by: INTERNAL MEDICINE

## 2018-09-08 PROCEDURE — 80048 BASIC METABOLIC PNL TOTAL CA: CPT | Performed by: INTERNAL MEDICINE

## 2018-09-08 PROCEDURE — 84439 ASSAY OF FREE THYROXINE: CPT | Performed by: INTERNAL MEDICINE

## 2018-09-08 PROCEDURE — 83036 HEMOGLOBIN GLYCOSYLATED A1C: CPT | Performed by: INTERNAL MEDICINE

## 2018-09-08 PROCEDURE — 80061 LIPID PANEL: CPT | Performed by: INTERNAL MEDICINE

## 2018-09-08 PROCEDURE — 36415 COLL VENOUS BLD VENIPUNCTURE: CPT | Performed by: INTERNAL MEDICINE

## 2018-09-08 PROCEDURE — 80076 HEPATIC FUNCTION PANEL: CPT | Performed by: INTERNAL MEDICINE

## 2018-09-08 PROCEDURE — 84443 ASSAY THYROID STIM HORMONE: CPT | Performed by: INTERNAL MEDICINE

## 2018-09-11 DIAGNOSIS — I10 ESSENTIAL HYPERTENSION WITH GOAL BLOOD PRESSURE LESS THAN 130/80: ICD-10-CM

## 2018-09-11 RX ORDER — LISINOPRIL 30 MG/1
TABLET ORAL
Qty: 90 TABLET | Refills: 0 | Status: SHIPPED | OUTPATIENT
Start: 2018-09-11 | End: 2018-10-17 | Stop reason: DRUGHIGH

## 2018-09-11 NOTE — TELEPHONE ENCOUNTER
"Last Written Prescription Date:  9/27/17  Last Fill Quantity: 90,  # refills: 3   Last office visit: 7/11/17   Future Office Visit:   Next 5 appointments (look out 90 days)     Sep 17, 2018  2:10 PM CDT   Return Visit with Scott Webster MD   [facility] ([facility])    [address]  [place] [address] [phone]                 Requested Prescriptions   Pending Prescriptions Disp Refills     lisinopril (PRINIVIL,ZESTRIL) 30 MG tablet [Pharmacy Med Name: LISINOPRIL TABS 30MG] 90 tablet 3     Sig: TAKE 1 TABLET DAILY    ACE Inhibitors (Including Combos) Protocol Passed    9/11/2018 12:54 PM       Passed - Blood pressure under 140/90 in past 12 months    BP Readings from Last 3 Encounters:   05/01/18 120/78   12/10/17 138/82   07/11/17 123/66                Passed - Recent (12 mo) or future (30 days) visit within the authorizing provider's specialty    Patient had office visit in the last 12 months or has a visit in the next 30 days with authorizing provider or within the authorizing provider's specialty.  See \"Patient Info\" tab in inbasket, or \"Choose Columns\" in Meds & Orders section of the refill encounter.           Passed - Patient is age 18 or older       Passed - No active pregnancy on record       Passed - Normal serum creatinine on file in past 12 months    Recent Labs   Lab Test  09/08/18   0921   CR  0.82            Passed - Normal serum potassium on file in past 12 months    Recent Labs   Lab Test  09/08/18   0921   POTASSIUM  4.6            Passed - No positive pregnancy test in past 12 months        Prescription approved per [facility] Refill Protocol.  Yohannes Garcia, RN, BSN    "

## 2018-09-17 ENCOUNTER — OFFICE VISIT (OUTPATIENT)
Dept: OTHER | Facility: CLINIC | Age: 73
End: 2018-09-17
Attending: INTERNAL MEDICINE
Payer: COMMERCIAL

## 2018-09-17 VITALS
SYSTOLIC BLOOD PRESSURE: 100 MMHG | WEIGHT: 161 LBS | BODY MASS INDEX: 27.64 KG/M2 | OXYGEN SATURATION: 97 % | DIASTOLIC BLOOD PRESSURE: 69 MMHG | HEART RATE: 85 BPM

## 2018-09-17 DIAGNOSIS — R05.8 DRY COUGH: ICD-10-CM

## 2018-09-17 DIAGNOSIS — Z00.00 MEDICARE ANNUAL WELLNESS VISIT, SUBSEQUENT: Primary | ICD-10-CM

## 2018-09-17 DIAGNOSIS — M81.8 OTHER OSTEOPOROSIS WITHOUT CURRENT PATHOLOGICAL FRACTURE: ICD-10-CM

## 2018-09-17 DIAGNOSIS — E78.2 MIXED HYPERLIPIDEMIA: ICD-10-CM

## 2018-09-17 DIAGNOSIS — K21.9 GASTROESOPHAGEAL REFLUX DISEASE WITHOUT ESOPHAGITIS: ICD-10-CM

## 2018-09-17 DIAGNOSIS — I10 ESSENTIAL HYPERTENSION WITH GOAL BLOOD PRESSURE LESS THAN 130/80: ICD-10-CM

## 2018-09-17 PROCEDURE — G0463 HOSPITAL OUTPT CLINIC VISIT: HCPCS

## 2018-09-17 PROCEDURE — 99213 OFFICE O/P EST LOW 20 MIN: CPT | Mod: 25 | Performed by: INTERNAL MEDICINE

## 2018-09-17 PROCEDURE — G0439 PPPS, SUBSEQ VISIT: HCPCS | Mod: ZP | Performed by: INTERNAL MEDICINE

## 2018-09-17 RX ORDER — RALOXIFENE HYDROCHLORIDE 60 MG/1
1 TABLET, FILM COATED ORAL DAILY
Qty: 90 TABLET | Refills: 3 | Status: SHIPPED | OUTPATIENT
Start: 2018-09-17 | End: 2019-06-04

## 2018-09-17 RX ORDER — ROSUVASTATIN CALCIUM 10 MG/1
10 TABLET, FILM COATED ORAL DAILY
Qty: 90 TABLET | Refills: 3 | Status: SHIPPED | OUTPATIENT
Start: 2018-09-17 | End: 2018-10-04

## 2018-09-17 NOTE — MR AVS SNAPSHOT
After Visit Summary   9/17/2018    Zahra Pulido    MRN: [id number]           Patient Information     Date Of Birth          1945        Visit Information        Provider Department      9/17/2018 2:10 PM Scott Webster MD [facility]      Today's Diagnoses     Medicare annual wellness visit, subsequent    -  1    Mixed hyperlipidemia        Essential hypertension with goal blood pressure less than 130/80        Other osteoporosis without current pathological fracture        Gastroesophageal reflux disease without esophagitis        Dry cough           Follow-ups after your visit        Additional Services     Follow-Up with Vascular Medicine                 Your next 10 appointments already scheduled     Oct 17, 2018  1:40 PM CDT   Return Visit with Scott Webster MD   [facility] ([facility])    [address]  [facility] [address] [phone]              Future tests that were ordered for you today     Open Future Orders        Priority Expected Expires Ordered    Follow-Up with Vascular Medicine Routine 9/17/2019 9/17/2019 9/17/2018    CBC with platelets differential Routine 9/17/2019 9/17/2019 9/17/2018    T3 Free Routine 9/17/2019 9/17/2019 9/17/2018    T4 free Routine 9/17/2019 9/17/2019 9/17/2018    TSH Routine 9/17/2019 9/17/2019 9/17/2018    Basic metabolic panel Routine 9/17/2019 9/17/2019 9/17/2018    Hepatic panel Routine 9/17/2019 9/17/2019 9/17/2018    Lipid Profile Routine 9/17/2019 9/17/2019 9/17/2018            Who to contact     If you have questions or need follow up information about today's clinic visit or your schedule please contact [facility] directly at [phone].  Normal or non-critical lab and imaging results will be communicated to you by MyChart, letter or phone  within 4 business days after the clinic has received the results. If you do not hear from us within 7 days, please contact the clinic through [employer] or phone. If you have a critical or abnormal lab result, we will notify you by phone as soon as possible.  Submit refill requests through [employer] or call your pharmacy and they will forward the refill request to us. Please allow 3 business days for your refill to be completed.          Additional Information About Your Visit        [employer] Information     [employer] gives you secure access to your electronic health record. If you see a primary care provider, you can also send messages to your care team and make appointments. If you have questions, please call your primary care clinic.  If you do not have a primary care provider, please call [phone] and they will assist you.        Care EveryWhere ID     This is your Care EveryWhere ID. This could be used by other organizations to access your [place] medical records  [id number]        Your Vitals Were     Pulse Pulse Oximetry Breastfeeding? BMI (Body Mass Index)          85 97% No 27.64 kg/m2         Blood Pressure from Last 3 Encounters:   09/17/18 100/69   05/01/18 120/78   12/10/17 138/82    Weight from Last 3 Encounters:   09/17/18 161 lb (73 kg)   05/01/18 165 lb (74.8 kg)   12/10/17 169 lb 3 oz (76.7 kg)              We Performed the Following     Follow-Up with Vascular Medicine     OFFICE/OUTPT VISIT,EST,LEVL III          Today's Medication Changes          These changes are accurate as of 9/17/18  3:31 PM.  If you have any questions, ask your nurse or doctor.               These medicines have changed or have updated prescriptions.        Dose/Directions    raloxifene 60 MG tablet   Commonly known as:  Evista   This may have changed:  See the new instructions.   Changed by:  Scott Webster MD        Dose:  1 tablet   Take 1 tablet (60 mg) by mouth daily   Quantity:  90 tablet   Refills:  3          Stop taking these medicines if you haven't already. Please contact your care team if you have questions.     alendronate 70 MG tablet   Commonly known as:  FOSAMAX   Stopped by:  Scott Webster MD                Where to get your medicines      These medications were sent to [facility]/pharmacy #[id number] - [place], [place] - [address]  [address] [facility] [address]     Phone:  [phone]     CRESTOR 10 MG tablet    omeprazole 20 MG CR capsule    raloxifene 60 MG tablet                Primary Care Provider Office Phone # Fax #    Scott Webster [phone] [address] [facility] [address]  [place] [address]        Equal Access to Services     [facility] : Hadii donald ku hadasho Soomaali, waaxda luqadaha, qaybta kaalmada adeegyada, waxtito [place] . So [facility] [phone].    ATENCIÓN: Si habla español, tiene a barrera disposición servicios gratuitos de asistencia lingüística. [facility] [phone].    We comply with applicable federal civil rights laws and [place] laws. We do not discriminate on the basis of race, color, national origin, age, disability, sex, sexual orientation, or gender identity.            Thank you!     Thank you for choosing [facility]  for your care. Our goal is always to provide you with excellent care. Hearing back from our patients is one way we can continue to improve our services. Please take a few minutes to complete the written survey that you may receive in the mail after your visit with us. Thank you!             Your Updated Medication List - Protect others around you: Learn how to safely use, store and throw away your medicines at www.disposemymeds.org.          This list is accurate as of 9/17/18  3:31 PM.  Always use your most recent med list.                   Brand Name Dispense Instructions for use Diagnosis    CRESTOR 10 MG tablet   Generic drug:  rosuvastatin     90 tablet    Take 1  tablet (10 mg) by mouth daily    Mixed hyperlipidemia       lisinopril 30 MG tablet    PRINIVIL,ZESTRIL    90 tablet    TAKE 1 TABLET DAILY    Essential hypertension with goal blood pressure less than 130/80       omeprazole 20 MG CR capsule    priLOSEC    90 capsule    Take 1 capsule (20 mg) by mouth daily    Gastroesophageal reflux disease without esophagitis       raloxifene 60 MG tablet    Evista    90 tablet    Take 1 tablet (60 mg) by mouth daily
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
## 2018-09-17 NOTE — NURSING NOTE
"Zahra Pulido is a 72 year old female who presents for:  Chief Complaint   Patient presents with     RECHECK     1 year F/U, labs on 9/8/18         Vitals:    Vitals:    09/17/18 1421   BP: 100/69   BP Location: Right arm   Patient Position: Chair   Cuff Size: Adult Regular   Pulse: 85   SpO2: 97%   Weight: 161 lb (73 kg)       BMI:  Estimated body mass index is 27.64 kg/(m^2) as calculated from the following:    Height as of 4/17/15: 5' 4\" (1.626 m).    Weight as of this encounter: 161 lb (73 kg).    Pain Score:  Data Unavailable        Anna Marie Celis MA      "

## 2018-10-04 RX ORDER — ROSUVASTATIN CALCIUM 10 MG/1
10 TABLET, COATED ORAL DAILY
Qty: 90 TABLET | Refills: 3 | Status: SHIPPED | OUTPATIENT
Start: 2018-10-04 | End: 2019-09-09

## 2018-10-12 ENCOUNTER — TELEPHONE (OUTPATIENT)
Dept: OTHER | Facility: CLINIC | Age: 73
End: 2018-10-12

## 2018-10-12 NOTE — TELEPHONE ENCOUNTER
Left voice mail for patient she needs lab done before schedule appointment 10/17/2018.  Anna Marie Celis MA

## 2018-10-17 ENCOUNTER — OFFICE VISIT (OUTPATIENT)
Dept: OTHER | Facility: CLINIC | Age: 73
End: 2018-10-17
Attending: INTERNAL MEDICINE
Payer: COMMERCIAL

## 2018-10-17 VITALS
HEART RATE: 86 BPM | SYSTOLIC BLOOD PRESSURE: 145 MMHG | OXYGEN SATURATION: 99 % | WEIGHT: 163 LBS | DIASTOLIC BLOOD PRESSURE: 86 MMHG | BODY MASS INDEX: 27.98 KG/M2

## 2018-10-17 DIAGNOSIS — I10 ESSENTIAL HYPERTENSION WITH GOAL BLOOD PRESSURE LESS THAN 130/80: Primary | ICD-10-CM

## 2018-10-17 PROCEDURE — 99214 OFFICE O/P EST MOD 30 MIN: CPT | Mod: ZP | Performed by: INTERNAL MEDICINE

## 2018-10-17 PROCEDURE — G0463 HOSPITAL OUTPT CLINIC VISIT: HCPCS

## 2018-10-17 RX ORDER — LISINOPRIL 40 MG/1
40 TABLET ORAL DAILY
Qty: 90 TABLET | Refills: 3 | Status: SHIPPED | OUTPATIENT
Start: 2018-10-17 | End: 2019-12-18

## 2018-10-17 NOTE — PROGRESS NOTES
Zahra Pulido is a 72 year old female who is presenting at the current time to discuss her diagnosi(es) of htn.      Review Of Systems  Skin: negative  Eyes: negative  Ears/Nose/Throat: negative  Respiratory: No shortness of breath, dyspnea on exertion, cough, or hemoptysis  Cardiovascular: negative  Gastrointestinal: negative  Genitourinary: negative  Musculoskeletal: negative  Neurologic: negative  Psychiatric: negative  Hematologic/Lymphatic/Immunologic: negative  Endocrine: negative    PAST MEDICAL HISTORY:                  Past Medical History:   Diagnosis Date     Esophageal reflux      Long term (current) use of anticoagulants      Need for prophylactic hormone replacement therapy (postmenopausal)     EVISTA     Phlebitis and thrombophlebitis of other deep vessels of lower extremities 2005     Rosacea      Unspecified essential hypertension        PAST SURGICAL HISTORY:                  Past Surgical History:   Procedure Laterality Date     C TOTAL KNEE ARTHROPLASTY  2005    right       CURRENT MEDICATIONS:                  Current Outpatient Prescriptions   Medication Sig Dispense Refill     lisinopril (PRINIVIL/ZESTRIL) 40 MG tablet Take 1 tablet (40 mg) by mouth daily 90 tablet 3     omeprazole (PRILOSEC) 20 MG CR capsule Take 1 capsule (20 mg) by mouth daily 90 capsule 3     raloxifene (EVISTA) 60 MG tablet Take 1 tablet (60 mg) by mouth daily 90 tablet 3     rosuvastatin (CRESTOR) 10 MG tablet Take 1 tablet (10 mg) by mouth daily 90 tablet 3     [DISCONTINUED] lisinopril (PRINIVIL,ZESTRIL) 30 MG tablet TAKE 1 TABLET DAILY 90 tablet 0       ALLERGIES:                  Allergies   Allergen Reactions     Penicillins Anaphylaxis     Face became swollen in her teenage years when she took PCN       SOCIAL HISTORY:                  Social History     Social History     Marital status:      Spouse name: N/A     Number of children: N/A     Years of education: N/A     Occupational History     Not on  file.     Social History Main Topics     Smoking status: Former Smoker     Packs/day: 1.50     Years: 4.00     Types: Cigarettes     Quit date: 8/17/1968     Smokeless tobacco: Never Used      Comment: quit 35 years ago     Alcohol use 0.0 oz/week     0 Standard drinks or equivalent per week      Comment: beer on the weekend     Drug use: No     Sexual activity: No     Other Topics Concern     Not on file     Social History Narrative       FAMILY HISTORY:                   Family History   Problem Relation Age of Onset     Arthritis Mother      rheumatoid arthritis     Cancer Father      pancreatic        Physical exam Reveals:    O/P: WNL  HEENT: WNL  NECK: No JVD, thyromegaly, or lymphadenopathy  HEART: RRR, no murmurs, gallops, or rubs  LUNGS: CTA bilaterally without rales, wheezes, or rhonchi  GI: NABS, nondistended, nontender, soft  EXT:without cyanosis, clubbing, or edema  NEURO: nonfocal  : no flank tenderness       A/P:    (I10) Essential hypertension with goal blood pressure less than 130/80  (primary encounter diagnosis)  Comment: not at goal, increase to the below, RTC four months for a BP check  Plan: lisinopril (PRINIVIL/ZESTRIL) 40 MG tablet

## 2018-10-17 NOTE — MR AVS SNAPSHOT
After Visit Summary   10/17/2018    Zahra Pulido    MRN: [id number]           Patient Information     Date Of Birth          1945        Visit Information        Provider Department      10/17/2018 1:40 PM Scott Webster MD [facility]      Today's Diagnoses     Essential hypertension with goal blood pressure less than 130/80    -  1       Follow-ups after your visit        Who to contact     If you have questions or need follow up information about today's clinic visit or your schedule please contact [facility] [place] directly at [phone].  Normal or non-critical lab and imaging results will be communicated to you by MyChart, letter or phone within 4 business days after the clinic has received the results. If you do not hear from us within 7 days, please contact the clinic through [email or web address] or phone. If you have a critical or abnormal lab result, we will notify you by phone as soon as possible.  Submit refill requests through [facility] or call your pharmacy and they will forward the refill request to us. Please allow 3 business days for your refill to be completed.          Additional Information About Your Visit        MyChart Information     [facility] gives you secure access to your electronic health record. If you see a primary care provider, you can also send messages to your care team and make appointments. If you have questions, please call your primary care clinic.  If you do not have a primary care provider, please call [phone] and they will assist you.        Care EveryWhere ID     This is your Care EveryWhere ID. This could be used by other organizations to access your [facility] medical records  [id number]        Your Vitals Were     Pulse Pulse Oximetry Breastfeeding? BMI (Body Mass Index)          86 99% No 27.98 kg/m2         Blood Pressure from Last 3 Encounters:   10/17/18 145/86    09/17/18 100/69   05/01/18 120/78    Weight from Last 3 Encounters:   10/17/18 163 lb (73.9 kg)   09/17/18 161 lb (73 kg)   05/01/18 165 lb (74.8 kg)              Today, you had the following     No orders found for display         Today's Medication Changes          These changes are accurate as of 10/17/18  3:22 PM.  If you have any questions, ask your nurse or doctor.               These medicines have changed or have updated prescriptions.        Dose/Directions    lisinopril 40 MG tablet   Commonly known as:  PRINIVIL/ZESTRIL   This may have changed:    - medication strength  - See the new instructions.   Used for:  Essential hypertension with goal blood pressure less than 130/80        Dose:  40 mg   Take 1 tablet (40 mg) by mouth daily   Quantity:  90 tablet   Refills:  3            Where to get your medicines      These medications were sent to [facility] - [address] [address]     Phone:  [phone]     lisinopril 40 MG tablet                Primary Care Provider Office Phone # Fax #    Scott Milton Webster [phone] [address]  [facility] [address]        Equal Access to Services     RICHARD GRAVES AH: Hadii donald paredes hadasho Sojuan rali, waaxda luqadaha, qaybta kaalmada adeegyada, jean-claude magallanes. So [facility] [phone].    ATENCIÓN: Si habla español, tiene a barrera disposición servicios gratuitos de asistencia lingüística. Llame al [phone].    We comply with applicable federal civil rights laws and [place] laws. We do not discriminate on the basis of race, color, national origin, age, disability, sex, sexual orientation, or gender identity.            Thank you!     Thank you for choosing [facility] [place]  for your care. Our goal is always to provide you with excellent care. Hearing back from our patients is one way we can continue to improve our  services. Please take a few minutes to complete the written survey that you may receive in the mail after your visit with us. Thank you!             Your Updated Medication List - Protect others around you: Learn how to safely use, store and throw away your medicines at www.disposemymeds.org.          This list is accurate as of 10/17/18  3:22 PM.  Always use your most recent med list.                   Brand Name Dispense Instructions for use Diagnosis    lisinopril 40 MG tablet    PRINIVIL/ZESTRIL    90 tablet    Take 1 tablet (40 mg) by mouth daily    Essential hypertension with goal blood pressure less than 130/80       omeprazole 20 MG CR capsule    priLOSEC    90 capsule    Take 1 capsule (20 mg) by mouth daily    Gastroesophageal reflux disease without esophagitis       raloxifene 60 MG tablet    Evista    90 tablet    Take 1 tablet (60 mg) by mouth daily        rosuvastatin 10 MG tablet    CRESTOR    90 tablet    Take 1 tablet (10 mg) by mouth daily    Mixed hyperlipidemia

## 2018-10-17 NOTE — NURSING NOTE
"Zahra Pulido is a 72 year old female who presents for:  Chief Complaint   Patient presents with     RECHECK     Follow up BP.        Vitals:    Vitals:    10/17/18 1340   BP: 145/86   BP Location: Right arm   Patient Position: Chair   Cuff Size: Adult Regular   Pulse: 86   SpO2: 99%   Weight: 163 lb (73.9 kg)       BMI:  Estimated body mass index is 27.98 kg/(m^2) as calculated from the following:    Height as of 4/17/15: 5' 4\" (1.626 m).    Weight as of this encounter: 163 lb (73.9 kg).    Pain Score:  Data Unavailable        Anna Marie Celis MA      "

## 2019-01-04 ENCOUNTER — OFFICE VISIT (OUTPATIENT)
Dept: URGENT CARE | Facility: URGENT CARE | Age: 74
End: 2019-01-04
Payer: COMMERCIAL

## 2019-01-04 VITALS
HEART RATE: 90 BPM | TEMPERATURE: 98.6 F | OXYGEN SATURATION: 97 % | RESPIRATION RATE: 16 BRPM | DIASTOLIC BLOOD PRESSURE: 64 MMHG | SYSTOLIC BLOOD PRESSURE: 108 MMHG

## 2019-01-04 DIAGNOSIS — R07.0 THROAT PAIN: Primary | ICD-10-CM

## 2019-01-04 LAB
DEPRECATED S PYO AG THROAT QL EIA: NORMAL
SPECIMEN SOURCE: NORMAL

## 2019-01-04 PROCEDURE — 99214 OFFICE O/P EST MOD 30 MIN: CPT | Performed by: PHYSICIAN ASSISTANT

## 2019-01-04 PROCEDURE — 87081 CULTURE SCREEN ONLY: CPT | Performed by: PHYSICIAN ASSISTANT

## 2019-01-04 PROCEDURE — 87880 STREP A ASSAY W/OPTIC: CPT | Performed by: PHYSICIAN ASSISTANT

## 2019-01-04 NOTE — PROGRESS NOTES
SUBJECTIVE:  Zahra Pulido is a 73 year old female with a chief complaint of sore throat.  Onset of symptoms was 1 day(s) ago.    Course of illness: still present.  Severity mild and moderate  Current and Associated symptoms: throat pain  Treatment measures tried include none.  Predisposing factors include nasal congestion, throat pain.    Past Medical History:   Diagnosis Date     Esophageal reflux      Long term (current) use of anticoagulants      Need for prophylactic hormone replacement therapy (postmenopausal)     EVISTA     Phlebitis and thrombophlebitis of other deep vessels of lower extremities      Rosacea      Unspecified essential hypertension      Allergies   Allergen Reactions     Penicillins Anaphylaxis     Face became swollen in her teenage years when she took PCN     Social History     Tobacco Use     Smoking status: Former Smoker     Packs/day: 1.50     Years: 4.00     Pack years: 6.00     Types: Cigarettes     Last attempt to quit: 1968     Years since quittin.4     Smokeless tobacco: Never Used     Tobacco comment: quit 35 years ago   Substance Use Topics     Alcohol use: Yes     Alcohol/week: 0.0 oz     Comment: beer on the weekend       ROS:  CONSTITUTIONAL:NEGATIVE for fever, chills, change in weight  INTEGUMENTARY/SKIN: NEGATIVE for worrisome rashes, moles or lesions  EYES: NEGATIVE for vision changes or irritation  ENT/MOUTH: POSITIVE for throat pain  RESP:NEGATIVE for significant cough or SOB  CV: NEGATIVE for chest pain, palpitations or peripheral edema  GI: NEGATIVE for nausea, abdominal pain, heartburn, or change in bowel habits  : normal menstrual cycles  MUSCULOSKELETAL: NEGATIVE for significant arthralgias or myalgia  NEURO: NEGATIVE for weakness, dizziness or paresthesias    OBJECTIVE:   /64   Pulse 90   Temp 98.6  F (37  C) (Oral)   Resp 16   SpO2 97%   GENERAL APPEARANCE: healthy, alert and no distress  EYES: EOMI,  PERRL, conjunctiva clear  HENT: ear  canals and TM's normal.  Nose normal.  Pharynx erythematous with some exudate noted.  NECK: supple, non-tender to palpation, no adenopathy noted  RESP: lungs clear to auscultation - no rales, rhonchi or wheezes  CV: regular rates and rhythm, normal S1 S2, no murmur noted  SKIN: no suspicious lesions or rashes    Results for orders placed or performed in visit on 01/04/19   Strep, Rapid Screen   Result Value Ref Range    Specimen Description Throat     Rapid Strep A Screen       NEGATIVE: No Group A streptococcal antigen detected by immunoassay, await culture report.         ASSESSMENT/PLAN      ICD-10-CM    1. Throat pain R07.0 Strep, Rapid Screen     Beta strep group A culture     magic mouthwash (ENTER INGREDIENTS IN COMMENTS) suspension       Symptomatic treat with gargles, lozenges, and OTC analgesic as needed. Follow-up with primary clinic if not improving.  Orders Placed This Encounter     magic mouthwash (ENTER INGREDIENTS IN COMMENTS) suspension       Strep culture pending  Tylenol  Magic mouthwash  Follow up as needed, consider nystatin if throat pain continues     none

## 2019-01-05 LAB
BACTERIA SPEC CULT: NORMAL
SPECIMEN SOURCE: NORMAL

## 2019-06-04 DIAGNOSIS — M81.8 OTHER OSTEOPOROSIS WITHOUT CURRENT PATHOLOGICAL FRACTURE: Primary | ICD-10-CM

## 2019-06-04 RX ORDER — RALOXIFENE HYDROCHLORIDE 60 MG/1
1 TABLET, FILM COATED ORAL DAILY
Qty: 90 TABLET | Refills: 3 | Status: SHIPPED | OUTPATIENT
Start: 2019-06-04 | End: 2022-01-01

## 2019-09-09 DIAGNOSIS — E78.2 MIXED HYPERLIPIDEMIA: ICD-10-CM

## 2019-09-10 RX ORDER — ROSUVASTATIN CALCIUM 10 MG/1
10 TABLET, COATED ORAL DAILY
Qty: 90 TABLET | Refills: 0 | Status: SHIPPED | OUTPATIENT
Start: 2019-09-10 | End: 2019-12-18

## 2019-10-01 ENCOUNTER — HEALTH MAINTENANCE LETTER (OUTPATIENT)
Age: 74
End: 2019-10-01

## 2019-10-12 DIAGNOSIS — K21.9 GASTROESOPHAGEAL REFLUX DISEASE WITHOUT ESOPHAGITIS: ICD-10-CM

## 2019-10-14 NOTE — TELEPHONE ENCOUNTER
Unable to refill per [facility] protocol.  Med and pharm loaded.    Lolis SELFN, RN    [facility]  [facility]  Office: [phone]  Fax: [phone]

## 2019-12-15 ENCOUNTER — HEALTH MAINTENANCE LETTER (OUTPATIENT)
Age: 74
End: 2019-12-15

## 2019-12-18 ENCOUNTER — TELEPHONE (OUTPATIENT)
Dept: OTHER | Facility: CLINIC | Age: 74
End: 2019-12-18

## 2019-12-18 DIAGNOSIS — I10 ESSENTIAL HYPERTENSION WITH GOAL BLOOD PRESSURE LESS THAN 130/80: Primary | ICD-10-CM

## 2019-12-18 DIAGNOSIS — Z00.00 MEDICARE ANNUAL WELLNESS VISIT, SUBSEQUENT: ICD-10-CM

## 2019-12-18 DIAGNOSIS — R05.8 DRY COUGH: ICD-10-CM

## 2019-12-18 DIAGNOSIS — I10 ESSENTIAL HYPERTENSION WITH GOAL BLOOD PRESSURE LESS THAN 130/80: ICD-10-CM

## 2019-12-18 DIAGNOSIS — E78.2 MIXED HYPERLIPIDEMIA: ICD-10-CM

## 2019-12-18 DIAGNOSIS — M81.0 OSTEOPOROSIS: ICD-10-CM

## 2019-12-18 DIAGNOSIS — K21.9 GASTROESOPHAGEAL REFLUX DISEASE WITHOUT ESOPHAGITIS: ICD-10-CM

## 2019-12-18 NOTE — TELEPHONE ENCOUNTER
Rosuvastatin    Last Written Prescription Date:  10/17/18  Last Fill Quantity: 90,  # refills: 3     Last office visit: 10/17/18    Patient is due for yearly exam.  Labs/orders updated in Epic.  Telephone encounter created for scheduling to arrange for appointment.  Note to pharmacy:  Appointment required for further refills.  90 day supply of medication loaded for review/signature.    April Villarreal RN BSN  [facility]

## 2019-12-18 NOTE — TELEPHONE ENCOUNTER
Rx refill requested - 90 day supply provided.  No more refills without office visit.  LOV 10/17/18.    Fasting labs, office visit at next available non-urgent spot.    Routing to scheduling.    April Villarreal RN BSN  [facility]

## 2019-12-18 NOTE — TELEPHONE ENCOUNTER
lisinopril (PRINIVIL/ZESTRIL) 40 MG tablet  Last Written Prescription Date:  10/17/18  Last Fill Quantity: 90,  # refills: 3     Last office visit: 10/17/18    Patient is due for yearly exam.  Labs/orders updated in Epic.  Telephone encounter created for scheduling to arrange for appointment.  Note to pharmacy:  Appointment required for further refills.  90 day supply of medication loaded for review/signature.    April Villarreal RN BSN  [facility]

## 2019-12-19 RX ORDER — LISINOPRIL 40 MG/1
40 TABLET ORAL DAILY
Qty: 90 TABLET | Refills: 0 | Status: SHIPPED | OUTPATIENT
Start: 2019-12-19 | End: 2020-04-10

## 2019-12-19 RX ORDER — ROSUVASTATIN CALCIUM 10 MG/1
TABLET, COATED ORAL
Qty: 90 TABLET | Refills: 0 | Status: SHIPPED | OUTPATIENT
Start: 2019-12-19 | End: 2020-03-16

## 2020-02-01 DIAGNOSIS — Z00.00 MEDICARE ANNUAL WELLNESS VISIT, SUBSEQUENT: ICD-10-CM

## 2020-02-01 DIAGNOSIS — I10 ESSENTIAL HYPERTENSION WITH GOAL BLOOD PRESSURE LESS THAN 130/80: ICD-10-CM

## 2020-02-01 DIAGNOSIS — E78.2 MIXED HYPERLIPIDEMIA: ICD-10-CM

## 2020-02-01 LAB
ALBUMIN SERPL-MCNC: 3.2 G/DL (ref 3.4–5)
ALP SERPL-CCNC: 96 U/L (ref 40–150)
ALT SERPL W P-5'-P-CCNC: 13 U/L (ref 0–50)
ANION GAP SERPL CALCULATED.3IONS-SCNC: 4 MMOL/L (ref 3–14)
AST SERPL W P-5'-P-CCNC: 18 U/L (ref 0–45)
BASOPHILS # BLD AUTO: 0 10E9/L (ref 0–0.2)
BASOPHILS NFR BLD AUTO: 0.4 %
BILIRUB DIRECT SERPL-MCNC: 0.2 MG/DL (ref 0–0.2)
BILIRUB SERPL-MCNC: 0.4 MG/DL (ref 0.2–1.3)
BUN SERPL-MCNC: 11 MG/DL (ref 7–30)
CALCIUM SERPL-MCNC: 8.8 MG/DL (ref 8.5–10.1)
CHLORIDE SERPL-SCNC: 107 MMOL/L (ref 94–109)
CHOLEST SERPL-MCNC: 155 MG/DL
CO2 SERPL-SCNC: 28 MMOL/L (ref 20–32)
CREAT SERPL-MCNC: 0.77 MG/DL (ref 0.52–1.04)
DIFFERENTIAL METHOD BLD: ABNORMAL
EOSINOPHIL # BLD AUTO: 0.1 10E9/L (ref 0–0.7)
EOSINOPHIL NFR BLD AUTO: 0.9 %
ERYTHROCYTE [DISTWIDTH] IN BLOOD BY AUTOMATED COUNT: 14.8 % (ref 10–15)
GFR SERPL CREATININE-BSD FRML MDRD: 76 ML/MIN/{1.73_M2}
GLUCOSE SERPL-MCNC: 84 MG/DL (ref 70–99)
HCT VFR BLD AUTO: 35.8 % (ref 35–47)
HDLC SERPL-MCNC: 88 MG/DL
HGB BLD-MCNC: 11.5 G/DL (ref 11.7–15.7)
LDLC SERPL CALC-MCNC: 57 MG/DL
LYMPHOCYTES # BLD AUTO: 1.5 10E9/L (ref 0.8–5.3)
LYMPHOCYTES NFR BLD AUTO: 26.9 %
MCH RBC QN AUTO: 27.8 PG (ref 26.5–33)
MCHC RBC AUTO-ENTMCNC: 32.1 G/DL (ref 31.5–36.5)
MCV RBC AUTO: 87 FL (ref 78–100)
MONOCYTES # BLD AUTO: 0.4 10E9/L (ref 0–1.3)
MONOCYTES NFR BLD AUTO: 7.6 %
NEUTROPHILS # BLD AUTO: 3.5 10E9/L (ref 1.6–8.3)
NEUTROPHILS NFR BLD AUTO: 64.2 %
NONHDLC SERPL-MCNC: 67 MG/DL
PLATELET # BLD AUTO: 200 10E9/L (ref 150–450)
POTASSIUM SERPL-SCNC: 3.9 MMOL/L (ref 3.4–5.3)
PROT SERPL-MCNC: 7.3 G/DL (ref 6.8–8.8)
RBC # BLD AUTO: 4.14 10E12/L (ref 3.8–5.2)
SODIUM SERPL-SCNC: 139 MMOL/L (ref 133–144)
T3FREE SERPL-MCNC: 2.2 PG/ML (ref 2.3–4.2)
T4 FREE SERPL-MCNC: 1.03 NG/DL (ref 0.76–1.46)
TRIGL SERPL-MCNC: 48 MG/DL
TSH SERPL DL<=0.005 MIU/L-ACNC: 2.18 MU/L (ref 0.4–4)
WBC # BLD AUTO: 5.4 10E9/L (ref 4–11)

## 2020-02-01 PROCEDURE — 80076 HEPATIC FUNCTION PANEL: CPT | Performed by: INTERNAL MEDICINE

## 2020-02-01 PROCEDURE — 84481 FREE ASSAY (FT-3): CPT | Performed by: INTERNAL MEDICINE

## 2020-02-01 PROCEDURE — 85025 COMPLETE CBC W/AUTO DIFF WBC: CPT | Performed by: INTERNAL MEDICINE

## 2020-02-01 PROCEDURE — 84443 ASSAY THYROID STIM HORMONE: CPT | Performed by: INTERNAL MEDICINE

## 2020-02-01 PROCEDURE — 36415 COLL VENOUS BLD VENIPUNCTURE: CPT | Performed by: INTERNAL MEDICINE

## 2020-02-01 PROCEDURE — 80061 LIPID PANEL: CPT | Performed by: INTERNAL MEDICINE

## 2020-02-01 PROCEDURE — 80048 BASIC METABOLIC PNL TOTAL CA: CPT | Performed by: INTERNAL MEDICINE

## 2020-02-01 PROCEDURE — 84439 ASSAY OF FREE THYROXINE: CPT | Performed by: INTERNAL MEDICINE

## 2020-02-11 ENCOUNTER — OFFICE VISIT (OUTPATIENT)
Dept: OTHER | Facility: CLINIC | Age: 75
End: 2020-02-11
Attending: INTERNAL MEDICINE
Payer: COMMERCIAL

## 2020-02-11 VITALS
OXYGEN SATURATION: 97 % | HEART RATE: 100 BPM | BODY MASS INDEX: 32.07 KG/M2 | RESPIRATION RATE: 16 BRPM | SYSTOLIC BLOOD PRESSURE: 152 MMHG | HEIGHT: 63 IN | DIASTOLIC BLOOD PRESSURE: 84 MMHG | WEIGHT: 181 LBS

## 2020-02-11 DIAGNOSIS — I10 ESSENTIAL HYPERTENSION WITH GOAL BLOOD PRESSURE LESS THAN 130/80: ICD-10-CM

## 2020-02-11 DIAGNOSIS — R94.6 BORDERLINE ABNORMAL TFTS: Primary | ICD-10-CM

## 2020-02-11 DIAGNOSIS — E78.2 MIXED HYPERLIPIDEMIA: ICD-10-CM

## 2020-02-11 DIAGNOSIS — K21.9 GASTROESOPHAGEAL REFLUX DISEASE WITHOUT ESOPHAGITIS: ICD-10-CM

## 2020-02-11 PROCEDURE — G0463 HOSPITAL OUTPT CLINIC VISIT: HCPCS

## 2020-02-11 PROCEDURE — 99214 OFFICE O/P EST MOD 30 MIN: CPT | Mod: ZP | Performed by: INTERNAL MEDICINE

## 2020-02-11 RX ORDER — METOPROLOL TARTRATE 25 MG/1
25 TABLET, FILM COATED ORAL 2 TIMES DAILY
Qty: 180 TABLET | Refills: 3 | Status: SHIPPED | OUTPATIENT
Start: 2020-02-11 | End: 2022-01-01

## 2020-02-11 ASSESSMENT — MIFFLIN-ST. JEOR: SCORE: 1290.14

## 2020-02-11 NOTE — PROGRESS NOTES
Zahra Pulido is a 74 year old female who is presenting at the current time to discuss her diagnosi(es) of        Essential hypertension with goal blood pressure less than 130/80  Mixed hyperlipidemia  Borderline abnormal TFTs  Gastroesophageal reflux disease without esophagitis      HPI: The patient has htn and is coming in for a lab f/u as well as BP check..      Review Of Systems  Skin: negative  Eyes: negative  Ears/Nose/Throat: negative  Respiratory: No shortness of breath, dyspnea on exertion, cough, or hemoptysis  Cardiovascular: negative  Gastrointestinal: negative  Genitourinary: negative  Musculoskeletal: negative  Neurologic: negative  Psychiatric: negative  Hematologic/Lymphatic/Immunologic: negative  Endocrine: negative       PAST MEDICAL HISTORY:                  Past Medical History:   Diagnosis Date     Esophageal reflux      Long term (current) use of anticoagulants      Need for prophylactic hormone replacement therapy (postmenopausal)     EVISTA     Phlebitis and thrombophlebitis of other deep vessels of lower extremities 2005     Rosacea      Unspecified essential hypertension        PAST SURGICAL HISTORY:                  Past Surgical History:   Procedure Laterality Date     C TOTAL KNEE ARTHROPLASTY  2005    right       CURRENT MEDICATIONS:                  Current Outpatient Medications   Medication Sig Dispense Refill     lisinopril (PRINIVIL/ZESTRIL) 40 MG tablet Take 1 tablet (40 mg) by mouth daily 90 tablet 0     magic mouthwash (ENTER INGREDIENTS IN COMMENTS) suspension Swish and spit 5-10 mLs in mouth every 6 hours as needed compound 30 ml Benadryl (12.5 mg/5 ml), 60 ml Maalox and 30 ml Viscous Lidocaine 120 mL 0     omeprazole (PRILOSEC) 20 MG DR capsule TAKE 1 CAPSULE BY MOUTH EVERY DAY 90 capsule 3     raloxifene (EVISTA) 60 MG tablet Take 1 tablet (60 mg) by mouth daily 90 tablet 3     rosuvastatin (CRESTOR) 10 MG tablet TAKE 1 TABLET BY MOUTH EVERY DAY 90 tablet 0        ALLERGIES:                  Allergies   Allergen Reactions     Penicillins Anaphylaxis     Face became swollen in her teenage years when she took PCN       SOCIAL HISTORY:                  Social History     Socioeconomic History     Marital status:      Spouse name: Not on file     Number of children: Not on file     Years of education: Not on file     Highest education level: Not on file   Occupational History     Not on file   Social Needs     Financial resource strain: Not on file     Food insecurity:     Worry: Not on file     Inability: Not on file     Transportation needs:     Medical: Not on file     Non-medical: Not on file   Tobacco Use     Smoking status: Former Smoker     Packs/day: 1.50     Years: 4.00     Pack years: 6.00     Types: Cigarettes     Last attempt to quit: 1968     Years since quittin.5     Smokeless tobacco: Never Used     Tobacco comment: quit 35 years ago   Substance and Sexual Activity     Alcohol use: Yes     Alcohol/week: 0.0 standard drinks     Comment: beer on the weekend     Drug use: No     Sexual activity: Never   Lifestyle     Physical activity:     Days per week: Not on file     Minutes per session: Not on file     Stress: Not on file   Relationships     Social connections:     Talks on phone: Not on file     Gets together: Not on file     Attends Gnosticist service: Not on file     Active member of club or organization: Not on file     Attends meetings of clubs or organizations: Not on file     Relationship status: Not on file     Intimate partner violence:     Fear of current or ex partner: Not on file     Emotionally abused: Not on file     Physically abused: Not on file     Forced sexual activity: Not on file   Other Topics Concern     Parent/sibling w/ CABG, MI or angioplasty before 65F 55M? Not Asked   Social History Narrative     Not on file       FAMILY HISTORY:                   Family History   Problem Relation Age of Onset     Arthritis Mother          rheumatoid arthritis     Cancer Father         pancreatic         Physical exam Reveals:    O/P: WNL  HEENT: WNL  NECK: No JVD, thyromegaly, or lymphadenopathy  HEART: RRR, no murmurs, gallops, or rubs  LUNGS: CTA bilaterally without rales, wheezes, or rhonchi  GI: NABS, nondistended, nontender, soft  EXT:without cyanosis, clubbing, or edema  NEURO: nonfocal  : no flank tenderness      Component      Latest Ref Rng & Units 2/1/2020   WBC      4.0 - 11.0 10e9/L 5.4   RBC Count      3.8 - 5.2 10e12/L 4.14   Hemoglobin      11.7 - 15.7 g/dL 11.5 (L)   Hematocrit      35.0 - 47.0 % 35.8   MCV      78 - 100 fl 87   MCH      26.5 - 33.0 pg 27.8   MCHC      31.5 - 36.5 g/dL 32.1   RDW      10.0 - 15.0 % 14.8   Platelet Count      150 - 450 10e9/L 200   % Neutrophils      % 64.2   % Lymphocytes      % 26.9   % Monocytes      % 7.6   % Eosinophils      % 0.9   % Basophils      % 0.4   Absolute Neutrophil      1.6 - 8.3 10e9/L 3.5   Absolute Lymphocytes      0.8 - 5.3 10e9/L 1.5   Absolute Monocytes      0.0 - 1.3 10e9/L 0.4   Absolute Eosinophils      0.0 - 0.7 10e9/L 0.1   Absolute Basophils      0.0 - 0.2 10e9/L 0.0   Diff Method       Automated Method   Sodium      133 - 144 mmol/L 139   Potassium      3.4 - 5.3 mmol/L 3.9   Chloride      94 - 109 mmol/L 107   Carbon Dioxide      20 - 32 mmol/L 28   Anion Gap      3 - 14 mmol/L 4   Glucose      70 - 99 mg/dL 84   Urea Nitrogen      7 - 30 mg/dL 11   Creatinine      0.52 - 1.04 mg/dL 0.77   GFR Estimate      >60 mL/min/1.73:m2 76   GFR Estimate If Black      >60 mL/min/1.73:m2 88   Calcium      8.5 - 10.1 mg/dL 8.8   Bilirubin Direct      0.0 - 0.2 mg/dL 0.2   Bilirubin Total      0.2 - 1.3 mg/dL 0.4   Albumin      3.4 - 5.0 g/dL 3.2 (L)   Protein Total      6.8 - 8.8 g/dL 7.3   Alkaline Phosphatase      40 - 150 U/L 96   ALT      0 - 50 U/L 13   AST      0 - 45 U/L 18   Cholesterol      <200 mg/dL 155   Triglycerides      <150 mg/dL 48   HDL Cholesterol      >49  mg/dL 88   LDL Cholesterol Calculated      <100 mg/dL 57   Non HDL Cholesterol      <130 mg/dL 67   TSH      0.40 - 4.00 mU/L 2.18   T4 Free      0.76 - 1.46 ng/dL 1.03   Free T3      2.3 - 4.2 pg/mL 2.2 (L)       A/P:  (R94.6) Borderline abnormal TFTs  (primary encounter diagnosis)  Comment: asx, check labs in six months  Plan: Follow-Up with Vascular Medicine, T3 Free, T4         free, TSH, Anti thyroglobulin antibody, Thyroid        peroxidase antibody, Thyroid stimulating         immunoglobulin            (I10) Essential hypertension with goal blood pressure less than 130/80  Comment: notat goal, start metoprolol 25 BID  Plan: Follow-Up with Vascular Medicine, Basic         metabolic panel           (E78.2) Mixed hyperlipidemia  Comment: at goal  Plan: Follow-Up with Vascular Medicine, Basic         metabolic panel, Hepatic panel, Lipid Profile           (K21.9) Gastroesophageal reflux disease without esophagitis  Comment: doing well  Plan: Follow-Up with Vascular Medicine, CBC with         platelets differential            Greater than one half the twenty five minutes total spent on the pt's visit were spent providing education and counselling to the patient regarding the above matters.

## 2020-02-11 NOTE — PROGRESS NOTES
"Zahra Pulido is a 74 year old female who presents for:  Chief Complaint   Patient presents with     RECHECK     Labs done 02/01/20 - follow up to 10/17/18 appt *LMB 12/19/19 resched from 02/03/20        Vitals:    Vitals:    02/11/20 1337   BP: (!) 152/84   BP Location: Right arm   Patient Position: Chair   Cuff Size: Adult Regular   Pulse: 100   Resp: 16   SpO2: 97%   Weight: 181 lb (82.1 kg)   Height: 5' 3\" (1.6 m)       BMI:  Estimated body mass index is 32.06 kg/m  as calculated from the following:    Height as of this encounter: 5' 3\" (1.6 m).    Weight as of this encounter: 181 lb (82.1 kg).    Pain Score:  Data Unavailable        Saurav Jackson CMA    "

## 2020-02-26 ENCOUNTER — TELEPHONE (OUTPATIENT)
Dept: OTHER | Facility: CLINIC | Age: 75
End: 2020-02-26

## 2020-02-26 NOTE — TELEPHONE ENCOUNTER
LM for patient to call us back to schedule her 6 week (due 03/24/20) Blood Pressure check with Dr Webster.  Patient was going to call us to schedule and to date we have not heard from her.

## 2020-02-28 NOTE — TELEPHONE ENCOUNTER
Patient stated that she will have her blood pressure taken and she will update us in mid March.  She does not wish to make an appointment at this time.    April Villarreal RN BSN  [facility]

## 2020-03-15 DIAGNOSIS — E78.2 MIXED HYPERLIPIDEMIA: ICD-10-CM

## 2020-03-16 RX ORDER — ROSUVASTATIN CALCIUM 10 MG/1
TABLET, COATED ORAL
Qty: 90 TABLET | Refills: 0 | Status: SHIPPED | OUTPATIENT
Start: 2020-03-16 | End: 2020-06-12

## 2020-03-16 NOTE — TELEPHONE ENCOUNTER
Last Written Prescription Date:  12/19/19  Last Fill Quantity: 90,  # refills: 0   Last office visit: 2/11/20  Future Office Visit:  TBD.     CLAIR MichelleN, RN  [facility]

## 2020-04-10 DIAGNOSIS — I10 ESSENTIAL HYPERTENSION WITH GOAL BLOOD PRESSURE LESS THAN 130/80: ICD-10-CM

## 2020-04-10 NOTE — TELEPHONE ENCOUNTER
lisinopril (PRINIVIL/ZESTRIL) 40 MG tablet  Last Written Prescription Date:  12/19/19  Last Fill Quantity: 90,  # refills: 0   Last office visit: 2/11/20    Unable to fill per [facility] protocol.    ACE Inhibitors (Including Combos) Protocol Itxwci71/10/2020 02:54 PM   Blood pressure under 140/90 in past 12 months       Medication and pharmacy loaded.

## 2020-04-13 RX ORDER — LISINOPRIL 40 MG/1
40 TABLET ORAL DAILY
Qty: 90 TABLET | Refills: 2 | Status: SHIPPED | OUTPATIENT
Start: 2020-04-13 | End: 2022-01-01

## 2020-06-12 DIAGNOSIS — E78.2 MIXED HYPERLIPIDEMIA: ICD-10-CM

## 2020-06-12 RX ORDER — ROSUVASTATIN CALCIUM 10 MG/1
TABLET, COATED ORAL
Qty: 90 TABLET | Refills: 0 | Status: SHIPPED | OUTPATIENT
Start: 2020-06-12 | End: 2022-01-01

## 2020-06-12 NOTE — TELEPHONE ENCOUNTER
"Last Written Prescription Date:  3/16/20  Last Fill Quantity: 90,  # refills: 0   Last office visit: 2/11/2020 with prescribing provider:  2/11/20   Future Office Visit:      Requested Prescriptions   Pending Prescriptions Disp Refills     rosuvastatin (CRESTOR) 10 MG tablet [Pharmacy Med Name: ROSUVASTATIN CALCIUM 10 MG TAB] 90 tablet 0     Sig: TAKE 1 TABLET BY MOUTH EVERY DAY       Statins Protocol Passed - 6/12/2020 12:35 AM        Passed - LDL on file in past 12 months     Recent Labs   Lab Test 02/01/20  0946   LDL 57             Passed - No abnormal creatine kinase in past 12 months     No lab results found.             Passed - Recent (12 mo) or future (30 days) visit within the authorizing provider's specialty     Patient has had an office visit with the authorizing provider or a provider within the authorizing providers department within the previous 12 mos or has a future within next 30 days. See \"Patient Info\" tab in inbasket, or \"Choose Columns\" in Meds & Orders section of the refill encounter.              Passed - Medication is active on med list        Passed - Patient is age 18 or older        Passed - No active pregnancy on record        Passed - No positive pregnancy test in past 12 months           Prescription approved per [facility] Refill Protocol.    Lolis KHAN, RN    [facility]  Office: [phone]  Fax: [phone]        "

## 2021-01-15 ENCOUNTER — HEALTH MAINTENANCE LETTER (OUTPATIENT)
Age: 76
End: 2021-01-15

## 2021-05-12 ENCOUNTER — OFFICE VISIT (OUTPATIENT)
Dept: PEDIATRICS | Facility: CLINIC | Age: 76
End: 2021-05-12
Payer: MEDICARE

## 2021-05-12 VITALS
HEART RATE: 93 BPM | SYSTOLIC BLOOD PRESSURE: 138 MMHG | RESPIRATION RATE: 16 BRPM | DIASTOLIC BLOOD PRESSURE: 84 MMHG | TEMPERATURE: 98.6 F | OXYGEN SATURATION: 96 % | BODY MASS INDEX: 31.97 KG/M2 | WEIGHT: 180.5 LBS

## 2021-05-12 DIAGNOSIS — L82.0 INFLAMED SEBORRHEIC KERATOSIS: Primary | ICD-10-CM

## 2021-05-12 DIAGNOSIS — Z00.00 HEALTHCARE MAINTENANCE: ICD-10-CM

## 2021-05-12 PROCEDURE — 99213 OFFICE O/P EST LOW 20 MIN: CPT | Mod: GE | Performed by: STUDENT IN AN ORGANIZED HEALTH CARE EDUCATION/TRAINING PROGRAM

## 2021-05-12 NOTE — PROGRESS NOTES
"    Assessment & Plan     Inflamed seborrheic keratosis  Likely seborrheic keratosis that got removed/irritated 2/2 location. Discussed with patient that if she prefers removal as it is directly in the area where she would brush her hair, that dermatology referral can help shave biopsy and take care of seborrheic keratosis. Patient is in agreement with this plan.   - DERMATOLOGY ADULT REFERRAL; Future    Healthcare Maintenance  Additionally strongly recommended that patient establish care for annual visit as she has not been takign her chronic medications; she declined making an appointment today (5/12) but is aware that we would like to see her as soon as she is able to restart her cholesterol and blood pressure medications. She declined refills on this at this point and states she had weaned herself off of the medications 9/2020.              Return in about 4 weeks (around 6/9/2021) for Physical Exam/Wellness.    This patient was discussed with Dr. Chano Solorzano MD  [facility] TOM Sweeney is a 75 year old who presents for the following health issues  accompanied by her SELF     HPI     Rash  Onset/Duration: X6 MONTHS   Description  Location: Left side of head   Character: round, raised, red, itchy   Itching: mild  Intensity:  mild  Progression of Symptoms:  worsening  Accompanying signs and symptoms:   Fever: no  Body aches or joint pain: no  Sore throat symptoms: no  Recent cold symptoms: no  History:           Previous episodes of similar rash: None  New exposures:  None  Recent travel: no  Exposure to similar rash: no  Precipitating or alleviating factors: NA   Therapies tried and outcome: nothing     #Seborrheic Keratosis   Pt states that they have had this for 5-6 months. Decided it was time to do \"something about it\".   Thinks that is about the same, thinks that it kind of scabbed off and there is erythema were a bit of it scabbed off. Mostly raised white part that " "appears scab like. No change in color. States there was a tiny bit of bleeding when the scab came off earlier. Pt states she put some \"goop\" on it previously (she is unable to identify exactly what she put on the skin) but denies trying any other medications / ointments/creams on the site. Denies any pervious skin issues. No other areas of the body or skin that she has noticed any lesions or skin changes. Some minor pruritis associated with it. No other major changes in her environment she has noticed or trauma tot he area that triggered it. No other travel or intense sun exposure. No hx of skin August.     #Health Maintenance  Wanting to establish care. Has stopped taking all of her medications since 9/2020. Did see Dr. Webster previously.       Review of Systems   Constitutional, HEENT, cardiovascular, pulmonary, gi and gu systems are negative, except as otherwise noted.      Objective    /84 (BP Location: Right arm, Patient Position: Chair, Cuff Size: Adult Regular)   Pulse 93   Temp 98.6  F (37  C) (Tympanic)   Resp 16   Wt 81.9 kg (180 lb 8 oz)   SpO2 96%   BMI 31.97 kg/m    Body mass index is 31.97 kg/m .  Physical Exam   GENERAL: healthy, alert and no distress  EYES: Eyes grossly normal to inspection, PERRL and conjunctivae and sclerae normal  HENT:nose and mouth without ulcers or lesions  RESP: breathing comfortably on RA   CV: well perfused appearance.   SKIN: As pictured below, appearance of inflamed/minorly destroyed seborrheic keratosis. No other lesions found in scalp or face. Did examine exposed arms and legs and no other obvious lesions noted.   NEURO: moving all extremities spontaneously, mentation intact and speech normal  PSYCH: mentation appears normal, affect normal/bright                Physician Attestation   I have reviewed the documentation from Dr. Solorzano and discussed the findings with Dr. Solorzano. I agree with the documentation of Dr. Solorzano. Her progress note reflects our joint assessment " and plan.     Leonides Madden M.D.  Internal Medicine-Pediatrics

## 2021-05-12 NOTE — PATIENT INSTRUCTIONS
Dermatology referral placed for removal of seborrheic keratosis     Please make appointment for annual physical at your convenience, as soon as possible       Patient Education     Understanding Seborrheic Keratosis  Seborrheic keratoses are wart-like growths on the skin. These growths are not cancer. Most people get these growths when they are middle age or older.    How to say it  [id number]  What causes seborrheic keratoses?  Doctors don't know what causes seborrheic keratoses. They may run in families. They become more common as people get older.   What are the symptoms of seborrheic keratoses?  Here is what seborrheic keratoses often look like:    They tend to be round or oval in shape. They can be very small or about as big across as a quarter.    They can appear singly or in clusters.    They tend to be tan, brown, or black in color.    The edges may be scalloped or uneven-looking.    They can have a waxy or scaly look.    They can be a bit raised, appearing to be  stuck on  the skin.    They may become red and irritated if scratched or rubbed by clothing  Sebhorrheic keratoses are not painful, but they may be itchy. They can become irritated if they are continually rubbed by skin or clothing. Seborrheic keratoses most often appear on the face, arms, chest, back, or belly.   How are seborrheic keratoses treated?  Seborrheic keratoses don t need to be treated unless they bother you. You may choose to have them removed because you find them unattractive. You may also want them removed because they get irritated and uncomfortable. Sometimes, seborrheic keratoses can look like more dangerous skin lesions. Your healthcare provider may remove them (biopsy) to tell the difference. A healthcare provider can remove them in an office visit. Ways that seborrheic keratoses can be removed include:     Freezing them off with liquid nitrogen    Cutting them off with a scalpel    Using electric current to  destroy the growth  What are possible complications of seborrheic keratoses?   Seborrheic keratoses are not cancer, but they can look like some types of skin cancer. So it s a good idea to ask your healthcare provider to check any new skin growths. Ask your healthcare provider about any skin problem that concerns you.   When should I call my healthcare provider?  Call your healthcare provider right away if any of these occur:    You develop a lot of seborrheic keratoses very quickly    You have a sore that doesn't heal within a few weeks, or heals and then comes back    You have a mole or skin growth that is changing in size, shape, or color    You have a mole or skin growth that looks different on one side from the other    You have a mole or skin growth that is not the same color throughout  [employer] last reviewed this educational content on 6/1/2019 2000-2021 [employer]. All rights reserved. This information is not intended as a substitute for professional medical care. Always follow your healthcare professional's instructions.

## 2021-09-01 ENCOUNTER — OFFICE VISIT (OUTPATIENT)
Dept: DERMATOLOGY | Facility: CLINIC | Age: 76
End: 2021-09-01
Attending: INTERNAL MEDICINE
Payer: MEDICARE

## 2021-09-01 VITALS — SYSTOLIC BLOOD PRESSURE: 137 MMHG | DIASTOLIC BLOOD PRESSURE: 68 MMHG | HEART RATE: 84 BPM

## 2021-09-01 DIAGNOSIS — L82.1 SEBORRHEIC KERATOSES: ICD-10-CM

## 2021-09-01 DIAGNOSIS — D48.5 NEOPLASM OF UNCERTAIN BEHAVIOR OF SKIN: ICD-10-CM

## 2021-09-01 DIAGNOSIS — L57.0 ACTINIC KERATOSES: Primary | ICD-10-CM

## 2021-09-01 DIAGNOSIS — L57.8 SOLAR ELASTOSIS: ICD-10-CM

## 2021-09-01 PROCEDURE — 99213 OFFICE O/P EST LOW 20 MIN: CPT | Mod: 25 | Performed by: PHYSICIAN ASSISTANT

## 2021-09-01 PROCEDURE — 17000 DESTRUCT PREMALG LESION: CPT | Performed by: PHYSICIAN ASSISTANT

## 2021-09-01 PROCEDURE — 17003 DESTRUCT PREMALG LES 2-14: CPT | Performed by: PHYSICIAN ASSISTANT

## 2021-09-01 NOTE — PATIENT INSTRUCTIONS
WOUND CARE INSTRUCTIONS  FOR CRYOSURGERY  For questions please call [phone]        This area treated with liquid nitrogen will form a blister. You do not need to bandage the area until after the blister forms and breaks (which may be a few days).  When the blister breaks, begin daily dressing changes as follows:    1) Clean and dry the area with tap water using clean Q-tip or sterile gauze pad.    2) Apply Vaseline or Aquaphor over entire wound. Other options include Polysporin ointment or Bacitracin ointment over entire wound.  Do NOT use Neosporin ointment.    3) Cover the wound with a band-aid or sterile non-stick gauze pad and micropore paper tape.      REPEAT THESE INSTRUCTIONS AT LEAST ONCE A DAY UNTIL THE WOUND HAS COMPLETELY HEALED.        It is an old wives tale that a wound heals better when it is exposed to air and allowed to dry out. The wound will heal faster with a better cosmetic result if it is kept moist with ointment and covered with a bandage.  Do not let the wound dry out.      Supplies Needed:     *Cotton tipped applicators (Q-tips)   *Polysporin ointment or Bacitracin ointment (NOT NEOSPORIN)   *Band-aids, or non stick gauze pads and micropore paper tape    PATIENT INFORMATION    During the healing process you will notice a number of changes. All wounds develop a small halo of redness surrounding the wound.  This means healing is occurring. Severe itching with extensive redness usually indicates sensitivity to the ointment or bandage tape used to dress the wound.  You should call our office if this develops.      Swelling and/or discoloration around your surgical site is common, particularly when performed around the eye.    All wounds normally drain.  The larger the wound the more drainage there will be.  After 7-10 days, you will notice the wound beginning to shrink and new skin will begin to grow.  The wound is healed when you can see skin has formed over the entire area.  A healed  wound has a healthy, shiny look to the surface and is red to dark pink in color to normalize.  Wounds may take approximately 4-6 weeks to heal.  Larger wounds may take 6-8 weeks.  After the wound is healed you may discontinue dressing changes.    You may experience a sensation of tightness as your wound heals. This is normal and will gradually subside.    Your healed wound may be sensitive to temperature changes. This sensitivity improves with time, but if you re having a lot of discomfort, try to avoid temperature extremes.    Patients frequently experience itching after their wound appears to have healed because of the continue healing under the skin.  Plain Vaseline will help relieve the itching.             Proper skin care from [facility]:    -Eliminate harsh soaps as they strip the natural oils from the skin, often resulting in dry itchy skin ( i.e. Dial, Zest, Maureen Spring)  -Use mild soaps such as Cetaphil or Dove Sensitive Skin in the shower. You do not need to use soap on arms, legs, and trunk every time you shower unless visibly soiled.   -Avoid hot or cold showers.  -After showering, lightly dry off and apply moisturizing within 2-3 minutes. This will help trap moisture in the skin.   -Aggressive use of a moisturizer at least 1-2 times a day to the entire body (including -Vanicream, Cetaphil, Aquaphor or Cerave) and moisturize hands after every washing.  -We recommend using moisturizers that come in a tub that needs to be scooped out, not a pump. This has more of an oil base. It will hold moisture in your skin much better than a water base moisturizer. The above recommended are non-pore clogging.      Wear a sunscreen with at least SPF 30 on your face, ears, neck and V of the chest daily. Wear sunscreen on other areas of the body if those areas are exposed to the sun throughout the day. Sunscreens can contain physical and/or chemical blockers. Physical blockers are less likely to clog pores,  these include zinc oxide and titanium dioxide. Reapply every two hour and after swimming.     Sunscreen examples: https://www.ewg.org/sunscreen/    UV radiation  UVA radiation remains constant throughout the day and throughout the year. It is a longer wavelength than UVB and therefore penetrates deeper into the skin leading to immediate and delayed tanning, photoaging, and skin cancer. 70-80% of UVA and UVB radiation occurs between the hours of 10am-2pm.  UVB radiation  UVB radiation causes the most harmful effects and is more significant during the summer months. However, snow and ice can reflect UVB radiation leading to skin damage during the winter months as well. UVB radiation is responsible for tanning, burning, inflammation, delayed erythema (pinkness), pigmentation (brown spots), and skin cancer.     I recommend self monthly full body exams and yearly full body exams with a dermatology provider. If you develop a new or changing lesion please follow up for examination. Most skin cancers are pink and scaly or pink and pearly. However, we do see blue/brown/black skin cancers.  Consider the ABCDEs of melanoma when giving yourself your monthly full body exam ( don't forget the groin, buttocks, feet, toes, etc). A-asymmetry, B-borders, C-color, D-diameter, E-elevation or evolving. If you see any of these changes please follow up in clinic. If you cannot see your back I recommend purchasing a hand held mirror to use with a larger wall mirror.

## 2021-09-01 NOTE — PROGRESS NOTES
HPI:  I was asked to see patient by Dr. Madden. Zahra Pulido is a 75 year old female patient here today for spot on left temple/left forehead .  Patient states this has been present for a while.  Patient reports the following symptoms: was irritated, now fine today .  Patient reports the following previous treatments: none.  Patient reports the following modifying factors: none.  Associated symptoms: none.  Patient has no other skin complaints today.  Remainder of the HPI, Meds, PMH, Allergies, FH, and SH was reviewed in chart.    Pertinent Hx:   No personal or family history of skin cancer    Past Medical History:   Diagnosis Date     Esophageal reflux      Long term (current) use of anticoagulants      Need for prophylactic hormone replacement therapy (postmenopausal)     EVISTA     Phlebitis and thrombophlebitis of other deep vessels of lower extremities      Rosacea      Unspecified essential hypertension        Past Surgical History:   Procedure Laterality Date     C TOTAL KNEE ARTHROPLASTY  2005    right        Family History   Problem Relation Age of Onset     Arthritis Mother         rheumatoid arthritis     Cancer Father         pancreatic       Social History     Socioeconomic History     Marital status:      Spouse name: Not on file     Number of children: Not on file     Years of education: Not on file     Highest education level: Not on file   Occupational History     Not on file   Tobacco Use     Smoking status: Former Smoker     Packs/day: 1.50     Years: 4.00     Pack years: 6.00     Types: Cigarettes     Quit date: 1968     Years since quittin.0     Smokeless tobacco: Never Used     Tobacco comment: quit 35 years ago   Substance and Sexual Activity     Alcohol use: Yes     Alcohol/week: 0.0 standard drinks     Comment: beer on the weekend     Drug use: No     Sexual activity: Never   Other Topics Concern     Parent/sibling w/ CABG, MI or angioplasty before 65F 55M? Not  Asked   Social History Narrative     Not on file     Social Determinants of Health     Financial Resource Strain:      Difficulty of Paying Living Expenses:    Food Insecurity:      Worried About Running Out of Food in the Last Year:      Ran Out of Food in the Last Year:    Transportation Needs:      Lack of Transportation (Medical):      Lack of Transportation (Non-Medical):    Physical Activity:      Days of Exercise per Week:      Minutes of Exercise per Session:    Stress:      Feeling of Stress :    Social Connections:      Frequency of Communication with Friends and Family:      Frequency of Social Gatherings with Friends and Family:      Attends Islam Services:      Active Member of Clubs or Organizations:      Attends Club or Organization Meetings:      Marital Status:    Intimate Partner Violence:      Fear of Current or Ex-Partner:      Emotionally Abused:      Physically Abused:      Sexually Abused:        Outpatient Encounter Medications as of 9/1/2021   Medication Sig Dispense Refill     lisinopril (ZESTRIL) 40 MG tablet Take 1 tablet (40 mg) by mouth daily 90 tablet 2     magic mouthwash (ENTER INGREDIENTS IN COMMENTS) suspension Swish and spit 5-10 mLs in mouth every 6 hours as needed compound 30 ml Benadryl (12.5 mg/5 ml), 60 ml Maalox and 30 ml Viscous Lidocaine 120 mL 0     metoprolol tartrate (LOPRESSOR) 25 MG tablet Take 1 tablet (25 mg) by mouth 2 times daily 180 tablet 3     omeprazole (PRILOSEC) 20 MG DR capsule TAKE 1 CAPSULE BY MOUTH EVERY DAY 90 capsule 3     raloxifene (EVISTA) 60 MG tablet Take 1 tablet (60 mg) by mouth daily 90 tablet 3     rosuvastatin (CRESTOR) 10 MG tablet TAKE 1 TABLET BY MOUTH EVERY DAY 90 tablet 0     No facility-administered encounter medications on file as of 9/1/2021.       Review Of Systems:  Skin: spot  Eyes: negative  Ears/Nose/Throat: negative  Respiratory: No shortness of breath, dyspnea on exertion, cough, or hemoptysis  Cardiovascular:  negative  Gastrointestinal: negative  Genitourinary: negative  Musculoskeletal: negative  Neurologic: negative  Psychiatric: negative  Hematologic/Lymphatic/Immunologic: negative  Endocrine: negative      Objective:     There were no vitals taken for this visit.  Eyes: Conjunctivae/lids: Normal   ENT: Lips:  Normal  MSK: Normal  Cardiovascular: Peripheral edema none  Pulm: Breathing Normal  Neuro/Psych: Orientation: A/O x 3. Normal; Mood/Affect: Normal, NAD, WDWN  Pt accompanied by: friend  Following areas examined: face, neck, ears  Ledesma skin type:i   Findings:  Brown, stuck-on scaly appearing papules on left temple/forhead, face  Pink gritty macule/s on nose x 2 and left inferior orbital rim x 1  Rhytides, hypo/hyperpigmentation, and atrophy  Pink and white scaly papules on dorsal hands  Pink smooth patch on left lateral neck 1.0cm        Assessment and Plan:     1) Seborrheic keratoses    I discussed the specifics of tumor, prognosis, and genetics of benign lesions.  I explained that treatment of these lesions would be purely cosmetic and not medically neccessary.  I discussed with patient different removal options including excision, cryotherapy, cautery and /or laser.  Lesion may recur and/or may not completely resolve. May need additional treatment.     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 Discussed AE include hypopigmentation (white spot) and recurrence. Follow up in 2-3 months to recheck lesions. There is a risk of AKs developing into a SCC.   Treatment options include LN2 vs PDT vs Efudex. Pt elected LN2    3) Katya ENNIS  Disc biopsy to rule out SCC/BCC-pt defers and will follow up if she would like a biopsy  HAKs on dorsal hands-pt defers treatment. Will follow up if she wants treatment  Signs and Symptoms of non-melanoma skin cancer and ABCDEs of melanoma reviewed with patient. Patient encouraged to perform monthly self skin exams and educated on how to perform  them. UV precautions reviewed with patient. Patient was asked about new or changing moles/lesions on body.   Wear a sunscreen with at least SPF 30 on your face, ears, neck and V of the chest daily. Wear sunscreen on other areas of the body if those areas are exposed to the sun throughout the day. Sunscreens can contain physical and/or chemical blockers. Physical blockers are less likely to clog pores, these include zinc oxide and titanium dioxide. Reapply every two hour and after swimming.Sunscreen examples: https://www.ewg.org/sunscreen/    Proper skin care from [facility]:    -Eliminate harsh soaps as they strip the natural oils from the skin, often resulting in dry itchy skin ( i.e. Dial, Zest, Maureen Spring)  -Use mild soaps such as Cetaphil or Dove Sensitive Skin in the shower. You do not need to use soap on arms, legs, and trunk every time you shower unless visibly soiled.   -Avoid hot or cold showers.  -After showering, lightly dry off and apply moisturizing within 2-3 minutes. This will help trap moisture in the skin.   -Aggressive use of a moisturizer at least 1-2 times a day to the entire body (including -Vanicream, Cetaphil, Aquaphor or Cerave) and moisturize hands after every washing.  -We recommend using moisturizers that come in a tub that needs to be scooped out, not a pump. This has more of an oil base. It will hold moisture in your skin much better than a water base moisturizer. The above recommended are non-pore clogging.         It was a pleasure speaking with Zahra Pulido today.       Follow up in 2 months if aks not resolved.

## 2021-09-01 NOTE — LETTER
2021         RE: Zahra Pulido  [address]  [facility] [address]        Dear Colleague,    Thank you for referring your patient, Zahra Pulido, to the [facility]. Please see a copy of my visit note below.    HPI:  Zahra Pulido is a 75 year old female patient here today for spot on left temple/left forehead .  Patient states this has been present for a while.  Patient reports the following symptoms: was irritated, now fine today .  Patient reports the following previous treatments: none.  Patient reports the following modifying factors: none.  Associated symptoms: none.  Patient has no other skin complaints today.  Remainder of the HPI, Meds, PMH, Allergies, FH, and SH was reviewed in chart.    Pertinent Hx:   No personal or family history of skin cancer    Past Medical History:   Diagnosis Date     Esophageal reflux      Long term (current) use of anticoagulants      Need for prophylactic hormone replacement therapy (postmenopausal)     EVISTA     Phlebitis and thrombophlebitis of other deep vessels of lower extremities      Rosacea      Unspecified essential hypertension        Past Surgical History:   Procedure Laterality Date     C TOTAL KNEE ARTHROPLASTY  2005    right        Family History   Problem Relation Age of Onset     Arthritis Mother         rheumatoid arthritis     Cancer Father         pancreatic       Social History     Socioeconomic History     Marital status:      Spouse name: Not on file     Number of children: Not on file     Years of education: Not on file     Highest education level: Not on file   Occupational History     Not on file   Tobacco Use     Smoking status: Former Smoker     Packs/day: 1.50     Years: 4.00     Pack years: 6.00     Types: Cigarettes     Quit date: 1968     Years since quittin.0     Smokeless tobacco: Never Used     Tobacco comment: quit 35 years ago   Substance and Sexual Activity      Alcohol use: Yes     Alcohol/week: 0.0 standard drinks     Comment: beer on the weekend     Drug use: No     Sexual activity: Never   Other Topics Concern     Parent/sibling w/ CABG, MI or angioplasty before 65F 55M? Not Asked   Social History Narrative     Not on file     Social Determinants of Health     Financial Resource Strain:      Difficulty of Paying Living Expenses:    Food Insecurity:      Worried About Running Out of Food in the Last Year:      Ran Out of Food in the Last Year:    Transportation Needs:      Lack of Transportation (Medical):      Lack of Transportation (Non-Medical):    Physical Activity:      Days of Exercise per Week:      Minutes of Exercise per Session:    Stress:      Feeling of Stress :    Social Connections:      Frequency of Communication with Friends and Family:      Frequency of Social Gatherings with Friends and Family:      Attends [employer] Services:      Active Member of Clubs or Organizations:      Attends Club or Organization Meetings:      Marital Status:    Intimate Partner Violence:      Fear of Current or Ex-Partner:      Emotionally Abused:      Physically Abused:      Sexually Abused:        Outpatient Encounter Medications as of 9/1/2021   Medication Sig Dispense Refill     lisinopril (ZESTRIL) 40 MG tablet Take 1 tablet (40 mg) by mouth daily 90 tablet 2     magic mouthwash (ENTER INGREDIENTS IN COMMENTS) suspension Swish and spit 5-10 mLs in mouth every 6 hours as needed compound 30 ml Benadryl (12.5 mg/5 ml), 60 ml Maalox and 30 ml Viscous Lidocaine 120 mL 0     metoprolol tartrate (LOPRESSOR) 25 MG tablet Take 1 tablet (25 mg) by mouth 2 times daily 180 tablet 3     omeprazole (PRILOSEC) 20 MG DR capsule TAKE 1 CAPSULE BY MOUTH EVERY DAY 90 capsule 3     raloxifene (EVISTA) 60 MG tablet Take 1 tablet (60 mg) by mouth daily 90 tablet 3     rosuvastatin (CRESTOR) 10 MG tablet TAKE 1 TABLET BY MOUTH EVERY DAY 90 tablet 0     No facility-administered encounter  medications on file as of 9/1/2021.       Review Of Systems:  Skin: spot  Eyes: negative  Ears/Nose/Throat: negative  Respiratory: No shortness of breath, dyspnea on exertion, cough, or hemoptysis  Cardiovascular: negative  Gastrointestinal: negative  Genitourinary: negative  Musculoskeletal: negative  Neurologic: negative  Psychiatric: negative  Hematologic/Lymphatic/Immunologic: negative  Endocrine: negative      Objective:     There were no vitals taken for this visit.  Eyes: Conjunctivae/lids: Normal   ENT: Lips:  Normal  MSK: Normal  Cardiovascular: Peripheral edema none  Pulm: Breathing Normal  Neuro/Psych: Orientation: A/O x 3. Normal; Mood/Affect: Normal, NAD, WDWN  Pt accompanied by: friend  Following areas examined: face, neck, ears  [place] skin type:i   Findings:  Brown, stuck-on scaly appearing papules on left temple/forhead, face  Pink gritty macule/s on nose x 2 and left inferior orbital rim x 1  Rhytides, hypo/hyperpigmentation, and atrophy  Pink and white scaly papules on dorsal hands  Pink smooth patch on left lateral neck 1.0cm        Assessment and Plan:     1) Seborrheic keratoses    I discussed the specifics of tumor, prognosis, and genetics of benign lesions.  I explained that treatment of these lesions would be purely cosmetic and not medically neccessary.  I discussed with patient different removal options including excision, cryotherapy, cautery and /or laser.  Lesion may recur and/or may not completely resolve. May need additional treatment.     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 Discussed AE include hypopigmentation (white spot) and recurrence. Follow up in 2-3 months to recheck lesions. There is a risk of AKs developing into a SCC.   Treatment options include LN2 vs PDT vs Efudex. Pt elected LN2    3) Katya ENNIS  Disc biopsy to rule out SCC/BCC-pt defers and will follow up if she would like a biopsy  HAKs on dorsal hands-pt defers treatment. Will  follow up if she wants treatment  Signs and Symptoms of non-melanoma skin cancer and ABCDEs of melanoma reviewed with patient. Patient encouraged to perform monthly self skin exams and educated on how to perform them. UV precautions reviewed with patient. Patient was asked about new or changing moles/lesions on body.   Wear a sunscreen with at least SPF 30 on your face, ears, neck and V of the chest daily. Wear sunscreen on other areas of the body if those areas are exposed to the sun throughout the day. Sunscreens can contain physical and/or chemical blockers. Physical blockers are less likely to clog pores, these include zinc oxide and titanium dioxide. Reapply every two hour and after swimming.Sunscreen examples: https://www.ewg.org/sunscreen/    Proper skin care from [facility]:    -Eliminate harsh soaps as they strip the natural oils from the skin, often resulting in dry itchy skin ( i.e. Dial, Zest, Maureen Spring)  -Use mild soaps such as Cetaphil or Dove Sensitive Skin in the shower. You do not need to use soap on arms, legs, and trunk every time you shower unless visibly soiled.   -Avoid hot or cold showers.  -After showering, lightly dry off and apply moisturizing within 2-3 minutes. This will help trap moisture in the skin.   -Aggressive use of a moisturizer at least 1-2 times a day to the entire body (including -Vanicream, Cetaphil, Aquaphor or Cerave) and moisturize hands after every washing.  -We recommend using moisturizers that come in a tub that needs to be scooped out, not a pump. This has more of an oil base. It will hold moisture in your skin much better than a water base moisturizer. The above recommended are non-pore clogging.         It was a pleasure speaking with Zahra Pulido today.       Follow up in 2 months if aks not resolved.         Again, thank you for allowing me to participate in the care of your patient.        Sincerely,        Kimberly Hou PA-C

## 2021-09-04 ENCOUNTER — HEALTH MAINTENANCE LETTER (OUTPATIENT)
Age: 76
End: 2021-09-04

## 2022-01-01 ENCOUNTER — APPOINTMENT (OUTPATIENT)
Dept: ULTRASOUND IMAGING | Facility: CLINIC | Age: 77
DRG: 640 | End: 2022-01-01
Attending: INTERNAL MEDICINE
Payer: MEDICARE

## 2022-01-01 ENCOUNTER — APPOINTMENT (OUTPATIENT)
Dept: CARDIOLOGY | Facility: CLINIC | Age: 77
DRG: 640 | End: 2022-01-01
Attending: INTERNAL MEDICINE
Payer: MEDICARE

## 2022-01-01 ENCOUNTER — LAB REQUISITION (OUTPATIENT)
Dept: LAB | Facility: CLINIC | Age: 77
End: 2022-01-01
Payer: MEDICARE

## 2022-01-01 ENCOUNTER — NURSING HOME VISIT (OUTPATIENT)
Dept: GERIATRICS | Facility: CLINIC | Age: 77
End: 2022-01-01
Payer: MEDICARE

## 2022-01-01 ENCOUNTER — DOCUMENTATION ONLY (OUTPATIENT)
Dept: OTHER | Facility: CLINIC | Age: 77
End: 2022-01-01

## 2022-01-01 ENCOUNTER — MEDICAL CORRESPONDENCE (OUTPATIENT)
Dept: HEALTH INFORMATION MANAGEMENT | Facility: CLINIC | Age: 77
End: 2022-01-01

## 2022-01-01 ENCOUNTER — HOSPITAL ENCOUNTER (INPATIENT)
Facility: CLINIC | Age: 77
LOS: 7 days | Discharge: HOSPICE | DRG: 640 | End: 2022-08-03
Attending: EMERGENCY MEDICINE | Admitting: INTERNAL MEDICINE
Payer: MEDICARE

## 2022-01-01 ENCOUNTER — HOSPITAL ENCOUNTER (EMERGENCY)
Facility: CLINIC | Age: 77
Discharge: NURSING, REHAB OR LONG-TERM CARE | End: 2022-08-16
Attending: EMERGENCY MEDICINE | Admitting: EMERGENCY MEDICINE
Payer: MEDICARE

## 2022-01-01 ENCOUNTER — APPOINTMENT (OUTPATIENT)
Dept: CT IMAGING | Facility: CLINIC | Age: 77
DRG: 640 | End: 2022-01-01
Attending: EMERGENCY MEDICINE
Payer: MEDICARE

## 2022-01-01 ENCOUNTER — APPOINTMENT (OUTPATIENT)
Dept: OCCUPATIONAL THERAPY | Facility: CLINIC | Age: 77
DRG: 640 | End: 2022-01-01
Attending: PSYCHIATRY & NEUROLOGY
Payer: MEDICARE

## 2022-01-01 ENCOUNTER — APPOINTMENT (OUTPATIENT)
Dept: GENERAL RADIOLOGY | Facility: CLINIC | Age: 77
End: 2022-01-01
Attending: EMERGENCY MEDICINE
Payer: MEDICARE

## 2022-01-01 ENCOUNTER — HOSPITAL ENCOUNTER (INPATIENT)
Dept: NEUROLOGY | Facility: CLINIC | Age: 77
Discharge: HOME | DRG: 640 | End: 2022-07-29
Attending: PSYCHIATRY & NEUROLOGY
Payer: MEDICARE

## 2022-01-01 ENCOUNTER — HEALTH MAINTENANCE LETTER (OUTPATIENT)
Age: 77
End: 2022-01-01

## 2022-01-01 ENCOUNTER — APPOINTMENT (OUTPATIENT)
Dept: GENERAL RADIOLOGY | Facility: CLINIC | Age: 77
DRG: 640 | End: 2022-01-01
Attending: EMERGENCY MEDICINE
Payer: MEDICARE

## 2022-01-01 ENCOUNTER — APPOINTMENT (OUTPATIENT)
Dept: OCCUPATIONAL THERAPY | Facility: CLINIC | Age: 77
DRG: 640 | End: 2022-01-01
Attending: INTERNAL MEDICINE
Payer: MEDICARE

## 2022-01-01 VITALS
SYSTOLIC BLOOD PRESSURE: 123 MMHG | WEIGHT: 153.2 LBS | TEMPERATURE: 98.2 F | HEART RATE: 80 BPM | OXYGEN SATURATION: 96 % | DIASTOLIC BLOOD PRESSURE: 73 MMHG | BODY MASS INDEX: 24.62 KG/M2 | HEIGHT: 66 IN | RESPIRATION RATE: 18 BRPM

## 2022-01-01 VITALS
RESPIRATION RATE: 14 BRPM | TEMPERATURE: 97.7 F | HEIGHT: 66 IN | DIASTOLIC BLOOD PRESSURE: 54 MMHG | BODY MASS INDEX: 24.09 KG/M2 | WEIGHT: 149.91 LBS | HEART RATE: 87 BPM | OXYGEN SATURATION: 94 % | SYSTOLIC BLOOD PRESSURE: 96 MMHG

## 2022-01-01 VITALS
SYSTOLIC BLOOD PRESSURE: 118 MMHG | WEIGHT: 160 LBS | OXYGEN SATURATION: 91 % | TEMPERATURE: 97.7 F | HEART RATE: 80 BPM | DIASTOLIC BLOOD PRESSURE: 68 MMHG | HEIGHT: 66 IN | BODY MASS INDEX: 25.71 KG/M2 | RESPIRATION RATE: 16 BRPM

## 2022-01-01 VITALS
RESPIRATION RATE: 18 BRPM | SYSTOLIC BLOOD PRESSURE: 110 MMHG | TEMPERATURE: 97.8 F | HEART RATE: 80 BPM | DIASTOLIC BLOOD PRESSURE: 76 MMHG | WEIGHT: 145.6 LBS | HEIGHT: 66 IN | BODY MASS INDEX: 23.4 KG/M2 | OXYGEN SATURATION: 98 %

## 2022-01-01 VITALS
OXYGEN SATURATION: 99 % | SYSTOLIC BLOOD PRESSURE: 120 MMHG | TEMPERATURE: 98.1 F | HEART RATE: 86 BPM | RESPIRATION RATE: 18 BRPM | DIASTOLIC BLOOD PRESSURE: 89 MMHG

## 2022-01-01 VITALS
SYSTOLIC BLOOD PRESSURE: 144 MMHG | WEIGHT: 162.4 LBS | TEMPERATURE: 97.8 F | DIASTOLIC BLOOD PRESSURE: 77 MMHG | RESPIRATION RATE: 18 BRPM | BODY MASS INDEX: 26.1 KG/M2 | OXYGEN SATURATION: 95 % | HEIGHT: 66 IN | HEART RATE: 79 BPM

## 2022-01-01 VITALS
SYSTOLIC BLOOD PRESSURE: 144 MMHG | RESPIRATION RATE: 18 BRPM | HEART RATE: 79 BPM | BODY MASS INDEX: 24.85 KG/M2 | OXYGEN SATURATION: 98 % | TEMPERATURE: 97.7 F | HEIGHT: 66 IN | WEIGHT: 154.6 LBS | DIASTOLIC BLOOD PRESSURE: 77 MMHG

## 2022-01-01 DIAGNOSIS — S72.002A CLOSED FRACTURE OF LEFT HIP, INITIAL ENCOUNTER (H): ICD-10-CM

## 2022-01-01 DIAGNOSIS — Z51.5 HOSPICE CARE PATIENT: Primary | ICD-10-CM

## 2022-01-01 DIAGNOSIS — R52 PAIN: ICD-10-CM

## 2022-01-01 DIAGNOSIS — E43 SEVERE PROTEIN-CALORIE MALNUTRITION (H): ICD-10-CM

## 2022-01-01 DIAGNOSIS — Z51.5 HOSPICE CARE PATIENT: ICD-10-CM

## 2022-01-01 DIAGNOSIS — F03.90 DEMENTIA WITHOUT BEHAVIORAL DISTURBANCE, UNSPECIFIED DEMENTIA TYPE: Primary | ICD-10-CM

## 2022-01-01 DIAGNOSIS — E87.6 HYPOKALEMIA: ICD-10-CM

## 2022-01-01 DIAGNOSIS — I48.91 ATRIAL FIBRILLATION WITH RVR (H): ICD-10-CM

## 2022-01-01 DIAGNOSIS — I10 BENIGN ESSENTIAL HYPERTENSION: ICD-10-CM

## 2022-01-01 DIAGNOSIS — S81.812A LACERATION OF LEFT LOWER EXTREMITY, INITIAL ENCOUNTER: ICD-10-CM

## 2022-01-01 DIAGNOSIS — L03.116 CELLULITIS OF LEFT LOWER EXTREMITY: ICD-10-CM

## 2022-01-01 DIAGNOSIS — R62.7 FAILURE TO THRIVE IN ADULT: ICD-10-CM

## 2022-01-01 DIAGNOSIS — G30.9 MIXED DEMENTIA (H): ICD-10-CM

## 2022-01-01 DIAGNOSIS — E78.5 HYPERLIPIDEMIA LDL GOAL <130: ICD-10-CM

## 2022-01-01 DIAGNOSIS — Z11.1 ENCOUNTER FOR SCREENING FOR RESPIRATORY TUBERCULOSIS: ICD-10-CM

## 2022-01-01 DIAGNOSIS — F03.90 DEMENTIA WITHOUT BEHAVIORAL DISTURBANCE, UNSPECIFIED DEMENTIA TYPE: ICD-10-CM

## 2022-01-01 DIAGNOSIS — S81.812D LACERATION OF LEFT LOWER EXTREMITY, SUBSEQUENT ENCOUNTER: ICD-10-CM

## 2022-01-01 DIAGNOSIS — E51.9 THIAMINE DEFICIENCY: ICD-10-CM

## 2022-01-01 DIAGNOSIS — U07.1 COVID-19: ICD-10-CM

## 2022-01-01 DIAGNOSIS — F01.50 MIXED DEMENTIA (H): ICD-10-CM

## 2022-01-01 DIAGNOSIS — S72.002D CLOSED LEFT HIP FRACTURE, WITH ROUTINE HEALING, SUBSEQUENT ENCOUNTER: Primary | ICD-10-CM

## 2022-01-01 DIAGNOSIS — S32.9XXA CLOSED NONDISPLACED FRACTURE OF PELVIS, UNSPECIFIED PART OF PELVIS, INITIAL ENCOUNTER (H): Primary | ICD-10-CM

## 2022-01-01 DIAGNOSIS — K21.9 GASTROESOPHAGEAL REFLUX DISEASE WITHOUT ESOPHAGITIS: ICD-10-CM

## 2022-01-01 DIAGNOSIS — S81.812D LACERATION OF LEFT LOWER EXTREMITY, SUBSEQUENT ENCOUNTER: Primary | ICD-10-CM

## 2022-01-01 DIAGNOSIS — I48.0 PAF (PAROXYSMAL ATRIAL FIBRILLATION) (H): ICD-10-CM

## 2022-01-01 DIAGNOSIS — K21.9 GASTROESOPHAGEAL REFLUX DISEASE, UNSPECIFIED WHETHER ESOPHAGITIS PRESENT: ICD-10-CM

## 2022-01-01 DIAGNOSIS — F02.80 MIXED DEMENTIA (H): ICD-10-CM

## 2022-01-01 LAB
ALBUMIN SERPL-MCNC: 2.1 G/DL (ref 3.4–5)
ALBUMIN SERPL-MCNC: 2.7 G/DL (ref 3.4–5)
ALBUMIN UR-MCNC: 20 MG/DL
ALP SERPL-CCNC: 114 U/L (ref 40–150)
ALP SERPL-CCNC: 95 U/L (ref 40–150)
ALT SERPL W P-5'-P-CCNC: 23 U/L (ref 0–50)
ALT SERPL W P-5'-P-CCNC: 31 U/L (ref 0–50)
AMMONIA PLAS-SCNC: 21 UMOL/L (ref 10–50)
ANION GAP SERPL CALCULATED.3IONS-SCNC: 11 MMOL/L (ref 3–14)
ANION GAP SERPL CALCULATED.3IONS-SCNC: 6 MMOL/L (ref 3–14)
ANION GAP SERPL CALCULATED.3IONS-SCNC: 6 MMOL/L (ref 3–14)
ANION GAP SERPL CALCULATED.3IONS-SCNC: 7 MMOL/L (ref 3–14)
APPEARANCE UR: CLEAR
APTT PPP: 32 SECONDS (ref 22–38)
AST SERPL W P-5'-P-CCNC: 37 U/L (ref 0–45)
AST SERPL W P-5'-P-CCNC: 47 U/L (ref 0–45)
ATRIAL RATE - MUSE: 119 BPM
ATRIAL RATE - MUSE: 80 BPM
BASOPHILS # BLD AUTO: 0 10E3/UL (ref 0–0.2)
BASOPHILS # BLD MANUAL: 0 10E3/UL (ref 0–0.2)
BASOPHILS NFR BLD AUTO: 0 %
BASOPHILS NFR BLD MANUAL: 0 %
BILIRUB DIRECT SERPL-MCNC: 0.4 MG/DL (ref 0–0.2)
BILIRUB SERPL-MCNC: 1 MG/DL (ref 0.2–1.3)
BILIRUB SERPL-MCNC: 1.1 MG/DL (ref 0.2–1.3)
BILIRUB UR QL STRIP: ABNORMAL
BUN SERPL-MCNC: 11 MG/DL (ref 7–30)
BUN SERPL-MCNC: 12 MG/DL (ref 7–30)
BUN SERPL-MCNC: 14 MG/DL (ref 7–30)
BUN SERPL-MCNC: 17 MG/DL (ref 7–30)
CALCIUM SERPL-MCNC: 8 MG/DL (ref 8.5–10.1)
CALCIUM SERPL-MCNC: 8 MG/DL (ref 8.5–10.1)
CALCIUM SERPL-MCNC: 8.2 MG/DL (ref 8.5–10.1)
CALCIUM SERPL-MCNC: 8.9 MG/DL (ref 8.5–10.1)
CHLORIDE BLD-SCNC: 105 MMOL/L (ref 94–109)
CHLORIDE BLD-SCNC: 105 MMOL/L (ref 94–109)
CHLORIDE BLD-SCNC: 91 MMOL/L (ref 94–109)
CHLORIDE BLD-SCNC: 99 MMOL/L (ref 94–109)
CK SERPL-CCNC: 163 U/L (ref 30–225)
CO2 SERPL-SCNC: 24 MMOL/L (ref 20–32)
CO2 SERPL-SCNC: 27 MMOL/L (ref 20–32)
CO2 SERPL-SCNC: 27 MMOL/L (ref 20–32)
CO2 SERPL-SCNC: 30 MMOL/L (ref 20–32)
COLOR UR AUTO: YELLOW
CREAT SERPL-MCNC: 0.59 MG/DL (ref 0.52–1.04)
CREAT SERPL-MCNC: 0.62 MG/DL (ref 0.52–1.04)
CREAT SERPL-MCNC: 0.65 MG/DL (ref 0.52–1.04)
CREAT SERPL-MCNC: 0.75 MG/DL (ref 0.52–1.04)
DIASTOLIC BLOOD PRESSURE - MUSE: NORMAL MMHG
DIASTOLIC BLOOD PRESSURE - MUSE: NORMAL MMHG
EOSINOPHIL # BLD AUTO: 0 10E3/UL (ref 0–0.7)
EOSINOPHIL # BLD MANUAL: 0 10E3/UL (ref 0–0.7)
EOSINOPHIL NFR BLD AUTO: 1 %
EOSINOPHIL NFR BLD MANUAL: 0 %
ERYTHROCYTE [DISTWIDTH] IN BLOOD BY AUTOMATED COUNT: 13.2 % (ref 10–15)
ERYTHROCYTE [DISTWIDTH] IN BLOOD BY AUTOMATED COUNT: 13.2 % (ref 10–15)
ERYTHROCYTE [DISTWIDTH] IN BLOOD BY AUTOMATED COUNT: 13.6 % (ref 10–15)
ETHANOL SERPL-MCNC: <0.01 G/DL
GAMMA INTERFERON BACKGROUND BLD IA-ACNC: 0.06 IU/ML
GFR SERPL CREATININE-BSD FRML MDRD: 82 ML/MIN/1.73M2
GFR SERPL CREATININE-BSD FRML MDRD: >90 ML/MIN/1.73M2
GLUCOSE BLD-MCNC: 128 MG/DL (ref 70–99)
GLUCOSE BLD-MCNC: 152 MG/DL (ref 70–99)
GLUCOSE BLD-MCNC: 85 MG/DL (ref 70–99)
GLUCOSE BLD-MCNC: 97 MG/DL (ref 70–99)
GLUCOSE UR STRIP-MCNC: NEGATIVE MG/DL
HCT VFR BLD AUTO: 33.8 % (ref 35–47)
HCT VFR BLD AUTO: 35.4 % (ref 35–47)
HCT VFR BLD AUTO: 43.8 % (ref 35–47)
HGB BLD-MCNC: 11.2 G/DL (ref 11.7–15.7)
HGB BLD-MCNC: 12.1 G/DL (ref 11.7–15.7)
HGB BLD-MCNC: 15.3 G/DL (ref 11.7–15.7)
HGB UR QL STRIP: ABNORMAL
HOLD SPECIMEN: NORMAL
IMM GRANULOCYTES # BLD: 0 10E3/UL
IMM GRANULOCYTES NFR BLD: 0 %
INR PPP: 1.3 (ref 0.85–1.15)
INR PPP: 2.13 (ref 0.85–1.15)
INTERPRETATION ECG - MUSE: NORMAL
INTERPRETATION ECG - MUSE: NORMAL
KETONES UR STRIP-MCNC: NEGATIVE MG/DL
LACTATE SERPL-SCNC: 2.8 MMOL/L (ref 0.7–2)
LEUKOCYTE ESTERASE UR QL STRIP: NEGATIVE
LVEF ECHO: NORMAL
LYMPHOCYTES # BLD AUTO: 1.3 10E3/UL (ref 0.8–5.3)
LYMPHOCYTES # BLD MANUAL: 0.3 10E3/UL (ref 0.8–5.3)
LYMPHOCYTES NFR BLD AUTO: 22 %
LYMPHOCYTES NFR BLD MANUAL: 2 %
M TB IFN-G BLD-IMP: NEGATIVE
M TB IFN-G CD4+ BCKGRND COR BLD-ACNC: 9.13 IU/ML
MAGNESIUM SERPL-MCNC: 1.8 MG/DL (ref 1.6–2.3)
MAGNESIUM SERPL-MCNC: 1.9 MG/DL (ref 1.6–2.3)
MAGNESIUM SERPL-MCNC: 2 MG/DL (ref 1.6–2.3)
MAGNESIUM SERPL-MCNC: 2.4 MG/DL (ref 1.6–2.3)
MCH RBC QN AUTO: 29.3 PG (ref 26.5–33)
MCH RBC QN AUTO: 29.3 PG (ref 26.5–33)
MCH RBC QN AUTO: 29.5 PG (ref 26.5–33)
MCHC RBC AUTO-ENTMCNC: 33.1 G/DL (ref 31.5–36.5)
MCHC RBC AUTO-ENTMCNC: 34.2 G/DL (ref 31.5–36.5)
MCHC RBC AUTO-ENTMCNC: 34.9 G/DL (ref 31.5–36.5)
MCV RBC AUTO: 84 FL (ref 78–100)
MCV RBC AUTO: 86 FL (ref 78–100)
MCV RBC AUTO: 89 FL (ref 78–100)
MITOGEN IGNF BCKGRD COR BLD-ACNC: 0 IU/ML
MITOGEN IGNF BCKGRD COR BLD-ACNC: 0 IU/ML
MONOCYTES # BLD AUTO: 0.6 10E3/UL (ref 0–1.3)
MONOCYTES # BLD MANUAL: 0.5 10E3/UL (ref 0–1.3)
MONOCYTES NFR BLD AUTO: 9 %
MONOCYTES NFR BLD MANUAL: 4 %
MUCOUS THREADS #/AREA URNS LPF: PRESENT /LPF
NEUTROPHILS # BLD AUTO: 4.1 10E3/UL (ref 1.6–8.3)
NEUTROPHILS # BLD MANUAL: 11.8 10E3/UL (ref 1.6–8.3)
NEUTROPHILS NFR BLD AUTO: 68 %
NEUTROPHILS NFR BLD MANUAL: 94 %
NITRATE UR QL: NEGATIVE
NRBC # BLD AUTO: 0 10E3/UL
NRBC BLD AUTO-RTO: 0 /100
P AXIS - MUSE: NORMAL DEGREES
P AXIS - MUSE: NORMAL DEGREES
PH UR STRIP: 6 [PH] (ref 5–7)
PLAT MORPH BLD: ABNORMAL
PLATELET # BLD AUTO: 172 10E3/UL (ref 150–450)
PLATELET # BLD AUTO: 249 10E3/UL (ref 150–450)
PLATELET # BLD AUTO: 282 10E3/UL (ref 150–450)
POTASSIUM BLD-SCNC: 2.2 MMOL/L (ref 3.4–5.3)
POTASSIUM BLD-SCNC: 2.6 MMOL/L (ref 3.4–5.3)
POTASSIUM BLD-SCNC: 3 MMOL/L (ref 3.4–5.3)
POTASSIUM BLD-SCNC: 3 MMOL/L (ref 3.4–5.3)
POTASSIUM BLD-SCNC: 3.1 MMOL/L (ref 3.4–5.3)
POTASSIUM BLD-SCNC: 3.4 MMOL/L (ref 3.4–5.3)
POTASSIUM BLD-SCNC: 3.5 MMOL/L (ref 3.4–5.3)
POTASSIUM BLD-SCNC: 3.7 MMOL/L (ref 3.4–5.3)
POTASSIUM BLD-SCNC: 3.8 MMOL/L (ref 3.4–5.3)
POTASSIUM BLD-SCNC: 3.9 MMOL/L (ref 3.4–5.3)
POTASSIUM BLD-SCNC: 4.1 MMOL/L (ref 3.4–5.3)
PR INTERVAL - MUSE: NORMAL MS
PR INTERVAL - MUSE: NORMAL MS
PROT SERPL-MCNC: 5.5 G/DL (ref 6.8–8.8)
PROT SERPL-MCNC: 7.1 G/DL (ref 6.8–8.8)
QRS DURATION - MUSE: 86 MS
QRS DURATION - MUSE: 88 MS
QT - MUSE: 374 MS
QT - MUSE: 392 MS
QTC - MUSE: 521 MS
QTC - MUSE: 537 MS
QUANTIFERON MITOGEN: 9.19 IU/ML
QUANTIFERON NIL TUBE: 0.06 IU/ML
QUANTIFERON TB1 TUBE: 0.06 IU/ML
QUANTIFERON TB2 TUBE: 0.06
R AXIS - MUSE: 44 DEGREES
R AXIS - MUSE: 46 DEGREES
RBC # BLD AUTO: 3.82 10E6/UL (ref 3.8–5.2)
RBC # BLD AUTO: 4.1 10E6/UL (ref 3.8–5.2)
RBC # BLD AUTO: 5.22 10E6/UL (ref 3.8–5.2)
RBC MORPH BLD: ABNORMAL
RBC URINE: 1 /HPF
SARS-COV-2 RNA RESP QL NAA+PROBE: NEGATIVE
SODIUM SERPL-SCNC: 132 MMOL/L (ref 133–144)
SODIUM SERPL-SCNC: 133 MMOL/L (ref 133–144)
SODIUM SERPL-SCNC: 135 MMOL/L (ref 133–144)
SODIUM SERPL-SCNC: 138 MMOL/L (ref 133–144)
SP GR UR STRIP: 1.02 (ref 1–1.03)
SQUAMOUS EPITHELIAL: <1 /HPF
SYSTOLIC BLOOD PRESSURE - MUSE: NORMAL MMHG
SYSTOLIC BLOOD PRESSURE - MUSE: NORMAL MMHG
T AXIS - MUSE: 44 DEGREES
T AXIS - MUSE: 44 DEGREES
TROPONIN I SERPL HS-MCNC: 16 NG/L
TROPONIN I SERPL HS-MCNC: 16 NG/L
TROPONIN I SERPL HS-MCNC: 19 NG/L
TSH SERPL DL<=0.005 MIU/L-ACNC: 0.64 MU/L (ref 0.4–4)
UROBILINOGEN UR STRIP-MCNC: 4 MG/DL
VENTRICULAR RATE- MUSE: 113 BPM
VENTRICULAR RATE- MUSE: 117 BPM
VIT B1 PYROPHOSHATE BLD-SCNC: 32 NMOL/L
VIT B12 SERPL-MCNC: 643 PG/ML (ref 232–1245)
WBC # BLD AUTO: 12.6 10E3/UL (ref 4–11)
WBC # BLD AUTO: 6.1 10E3/UL (ref 4–11)
WBC # BLD AUTO: 7.1 10E3/UL (ref 4–11)
WBC URINE: 1 /HPF

## 2022-01-01 PROCEDURE — 258N000003 HC RX IP 258 OP 636: Performed by: EMERGENCY MEDICINE

## 2022-01-01 PROCEDURE — 85027 COMPLETE CBC AUTOMATED: CPT | Performed by: INTERNAL MEDICINE

## 2022-01-01 PROCEDURE — 99309 SBSQ NF CARE MODERATE MDM 30: CPT | Mod: GW

## 2022-01-01 PROCEDURE — 12035 INTMD RPR S/A/T/EXT 12.6-20: CPT

## 2022-01-01 PROCEDURE — 258N000003 HC RX IP 258 OP 636: Performed by: INTERNAL MEDICINE

## 2022-01-01 PROCEDURE — 84132 ASSAY OF SERUM POTASSIUM: CPT | Performed by: INTERNAL MEDICINE

## 2022-01-01 PROCEDURE — 36415 COLL VENOUS BLD VENIPUNCTURE: CPT | Mod: ORL

## 2022-01-01 PROCEDURE — 70450 CT HEAD/BRAIN W/O DYE: CPT | Mod: MG

## 2022-01-01 PROCEDURE — 250N000013 HC RX MED GY IP 250 OP 250 PS 637: Performed by: INTERNAL MEDICINE

## 2022-01-01 PROCEDURE — 250N000011 HC RX IP 250 OP 636: Performed by: EMERGENCY MEDICINE

## 2022-01-01 PROCEDURE — 93005 ELECTROCARDIOGRAM TRACING: CPT

## 2022-01-01 PROCEDURE — 250N000009 HC RX 250: Performed by: INTERNAL MEDICINE

## 2022-01-01 PROCEDURE — 36415 COLL VENOUS BLD VENIPUNCTURE: CPT | Performed by: INTERNAL MEDICINE

## 2022-01-01 PROCEDURE — 99232 SBSQ HOSP IP/OBS MODERATE 35: CPT | Performed by: INTERNAL MEDICINE

## 2022-01-01 PROCEDURE — 84484 ASSAY OF TROPONIN QUANT: CPT | Performed by: INTERNAL MEDICINE

## 2022-01-01 PROCEDURE — 82947 ASSAY GLUCOSE BLOOD QUANT: CPT | Performed by: INTERNAL MEDICINE

## 2022-01-01 PROCEDURE — 84425 ASSAY OF VITAMIN B-1: CPT | Performed by: INTERNAL MEDICINE

## 2022-01-01 PROCEDURE — 99231 SBSQ HOSP IP/OBS SF/LOW 25: CPT | Performed by: INTERNAL MEDICINE

## 2022-01-01 PROCEDURE — 82077 ASSAY SPEC XCP UR&BREATH IA: CPT | Performed by: EMERGENCY MEDICINE

## 2022-01-01 PROCEDURE — 85004 AUTOMATED DIFF WBC COUNT: CPT | Performed by: INTERNAL MEDICINE

## 2022-01-01 PROCEDURE — 90715 TDAP VACCINE 7 YRS/> IM: CPT | Performed by: EMERGENCY MEDICINE

## 2022-01-01 PROCEDURE — 120N000001 HC R&B MED SURG/OB

## 2022-01-01 PROCEDURE — 210N000002 HC R&B HEART CARE

## 2022-01-01 PROCEDURE — 99233 SBSQ HOSP IP/OBS HIGH 50: CPT | Performed by: INTERNAL MEDICINE

## 2022-01-01 PROCEDURE — 250N000011 HC RX IP 250 OP 636: Performed by: INTERNAL MEDICINE

## 2022-01-01 PROCEDURE — 82550 ASSAY OF CK (CPK): CPT | Performed by: EMERGENCY MEDICINE

## 2022-01-01 PROCEDURE — 84443 ASSAY THYROID STIM HORMONE: CPT | Performed by: INTERNAL MEDICINE

## 2022-01-01 PROCEDURE — 36415 COLL VENOUS BLD VENIPUNCTURE: CPT | Performed by: EMERGENCY MEDICINE

## 2022-01-01 PROCEDURE — 96365 THER/PROPH/DIAG IV INF INIT: CPT

## 2022-01-01 PROCEDURE — 250N000009 HC RX 250

## 2022-01-01 PROCEDURE — 86481 TB AG RESPONSE T-CELL SUSP: CPT | Mod: ORL

## 2022-01-01 PROCEDURE — 99223 1ST HOSP IP/OBS HIGH 75: CPT | Mod: AI | Performed by: INTERNAL MEDICINE

## 2022-01-01 PROCEDURE — 85007 BL SMEAR W/DIFF WBC COUNT: CPT | Performed by: EMERGENCY MEDICINE

## 2022-01-01 PROCEDURE — 82607 VITAMIN B-12: CPT | Performed by: INTERNAL MEDICINE

## 2022-01-01 PROCEDURE — 76705 ECHO EXAM OF ABDOMEN: CPT

## 2022-01-01 PROCEDURE — 99283 EMERGENCY DEPT VISIT LOW MDM: CPT | Mod: 25

## 2022-01-01 PROCEDURE — 83735 ASSAY OF MAGNESIUM: CPT | Performed by: INTERNAL MEDICINE

## 2022-01-01 PROCEDURE — 83605 ASSAY OF LACTIC ACID: CPT | Performed by: INTERNAL MEDICINE

## 2022-01-01 PROCEDURE — 0054A HC ADMIN COVID VAC PFIZER TRS-SUCR, BOOSTER: CPT | Performed by: INTERNAL MEDICINE

## 2022-01-01 PROCEDURE — 99207 PR NO BILLABLE SERVICE THIS VISIT: CPT | Mod: GV

## 2022-01-01 PROCEDURE — 80048 BASIC METABOLIC PNL TOTAL CA: CPT | Performed by: INTERNAL MEDICINE

## 2022-01-01 PROCEDURE — 71045 X-RAY EXAM CHEST 1 VIEW: CPT

## 2022-01-01 PROCEDURE — 82248 BILIRUBIN DIRECT: CPT | Performed by: INTERNAL MEDICINE

## 2022-01-01 PROCEDURE — 93306 TTE W/DOPPLER COMPLETE: CPT | Mod: 26 | Performed by: INTERNAL MEDICINE

## 2022-01-01 PROCEDURE — 91305 HC RX IP 250 OP 636: CPT | Performed by: INTERNAL MEDICINE

## 2022-01-01 PROCEDURE — 82140 ASSAY OF AMMONIA: CPT | Performed by: INTERNAL MEDICINE

## 2022-01-01 PROCEDURE — P9604 ONE-WAY ALLOW PRORATED TRIP: HCPCS | Mod: ORL

## 2022-01-01 PROCEDURE — 95816 EEG AWAKE AND DROWSY: CPT

## 2022-01-01 PROCEDURE — 85730 THROMBOPLASTIN TIME PARTIAL: CPT | Performed by: EMERGENCY MEDICINE

## 2022-01-01 PROCEDURE — 80053 COMPREHEN METABOLIC PANEL: CPT | Performed by: INTERNAL MEDICINE

## 2022-01-01 PROCEDURE — 85610 PROTHROMBIN TIME: CPT | Performed by: INTERNAL MEDICINE

## 2022-01-01 PROCEDURE — 85610 PROTHROMBIN TIME: CPT | Performed by: EMERGENCY MEDICINE

## 2022-01-01 PROCEDURE — 81001 URINALYSIS AUTO W/SCOPE: CPT | Performed by: EMERGENCY MEDICINE

## 2022-01-01 PROCEDURE — U0003 INFECTIOUS AGENT DETECTION BY NUCLEIC ACID (DNA OR RNA); SEVERE ACUTE RESPIRATORY SYNDROME CORONAVIRUS 2 (SARS-COV-2) (CORONAVIRUS DISEASE [COVID-19]), AMPLIFIED PROBE TECHNIQUE, MAKING USE OF HIGH THROUGHPUT TECHNOLOGIES AS DESCRIBED BY CMS-2020-01-R: HCPCS | Mod: ORL

## 2022-01-01 PROCEDURE — 96368 THER/DIAG CONCURRENT INF: CPT

## 2022-01-01 PROCEDURE — 99285 EMERGENCY DEPT VISIT HI MDM: CPT | Mod: 25

## 2022-01-01 PROCEDURE — C9803 HOPD COVID-19 SPEC COLLECT: HCPCS

## 2022-01-01 PROCEDURE — 85018 HEMOGLOBIN: CPT | Performed by: EMERGENCY MEDICINE

## 2022-01-01 PROCEDURE — 90471 IMMUNIZATION ADMIN: CPT | Performed by: EMERGENCY MEDICINE

## 2022-01-01 PROCEDURE — 99310 SBSQ NF CARE HIGH MDM 45: CPT | Mod: GV

## 2022-01-01 PROCEDURE — 97535 SELF CARE MNGMENT TRAINING: CPT | Mod: GO

## 2022-01-01 PROCEDURE — 73590 X-RAY EXAM OF LOWER LEG: CPT | Mod: LT

## 2022-01-01 PROCEDURE — 99239 HOSP IP/OBS DSCHRG MGMT >30: CPT | Performed by: INTERNAL MEDICINE

## 2022-01-01 PROCEDURE — 999N000208 ECHOCARDIOGRAM COMPLETE

## 2022-01-01 PROCEDURE — 97166 OT EVAL MOD COMPLEX 45 MIN: CPT | Mod: GO

## 2022-01-01 PROCEDURE — 255N000002 HC RX 255 OP 636: Performed by: INTERNAL MEDICINE

## 2022-01-01 PROCEDURE — 99305 1ST NF CARE MODERATE MDM 35: CPT | Mod: GV | Performed by: INTERNAL MEDICINE

## 2022-01-01 PROCEDURE — U0005 INFEC AGEN DETEC AMPLI PROBE: HCPCS | Mod: ORL

## 2022-01-01 PROCEDURE — 97530 THERAPEUTIC ACTIVITIES: CPT | Mod: GO

## 2022-01-01 PROCEDURE — XW023V7 INTRODUCTION OF COVID-19 VACCINE DOSE 3 INTO MUSCLE, PERCUTANEOUS APPROACH, NEW TECHNOLOGY GROUP 7: ICD-10-PCS | Performed by: INTERNAL MEDICINE

## 2022-01-01 PROCEDURE — 84132 ASSAY OF SERUM POTASSIUM: CPT | Performed by: STUDENT IN AN ORGANIZED HEALTH CARE EDUCATION/TRAINING PROGRAM

## 2022-01-01 PROCEDURE — 250N000009 HC RX 250: Performed by: EMERGENCY MEDICINE

## 2022-01-01 PROCEDURE — 96376 TX/PRO/DX INJ SAME DRUG ADON: CPT

## 2022-01-01 PROCEDURE — U0005 INFEC AGEN DETEC AMPLI PROBE: HCPCS | Performed by: EMERGENCY MEDICINE

## 2022-01-01 PROCEDURE — 84484 ASSAY OF TROPONIN QUANT: CPT | Performed by: EMERGENCY MEDICINE

## 2022-01-01 PROCEDURE — 82310 ASSAY OF CALCIUM: CPT | Performed by: INTERNAL MEDICINE

## 2022-01-01 PROCEDURE — 83735 ASSAY OF MAGNESIUM: CPT | Performed by: EMERGENCY MEDICINE

## 2022-01-01 PROCEDURE — 80053 COMPREHEN METABOLIC PANEL: CPT | Performed by: EMERGENCY MEDICINE

## 2022-01-01 RX ORDER — LIDOCAINE 40 MG/G
CREAM TOPICAL
Status: DISCONTINUED | OUTPATIENT
Start: 2022-01-01 | End: 2022-01-01 | Stop reason: HOSPADM

## 2022-01-01 RX ORDER — POTASSIUM CHLORIDE 1.5 G/1.58G
20 POWDER, FOR SOLUTION ORAL ONCE
Status: COMPLETED | OUTPATIENT
Start: 2022-01-01 | End: 2022-01-01

## 2022-01-01 RX ORDER — ONDANSETRON 2 MG/ML
4 INJECTION INTRAMUSCULAR; INTRAVENOUS EVERY 6 HOURS PRN
Status: DISCONTINUED | OUTPATIENT
Start: 2022-01-01 | End: 2022-01-01 | Stop reason: HOSPADM

## 2022-01-01 RX ORDER — POTASSIUM CHLORIDE 1.5 G/1.58G
40 POWDER, FOR SOLUTION ORAL ONCE
Status: DISCONTINUED | OUTPATIENT
Start: 2022-01-01 | End: 2022-01-01

## 2022-01-01 RX ORDER — LANOLIN ALCOHOL/MO/W.PET/CERES
100 CREAM (GRAM) TOPICAL DAILY
DISCHARGE
Start: 2022-01-01

## 2022-01-01 RX ORDER — SODIUM CHLORIDE AND POTASSIUM CHLORIDE 150; 900 MG/100ML; MG/100ML
INJECTION, SOLUTION INTRAVENOUS CONTINUOUS
Status: DISCONTINUED | OUTPATIENT
Start: 2022-01-01 | End: 2022-01-01

## 2022-01-01 RX ORDER — AMOXICILLIN 250 MG
1 CAPSULE ORAL 2 TIMES DAILY PRN
Status: DISCONTINUED | OUTPATIENT
Start: 2022-01-01 | End: 2022-01-01 | Stop reason: HOSPADM

## 2022-01-01 RX ORDER — POTASSIUM CHLORIDE 1.5 G/1.58G
40 POWDER, FOR SOLUTION ORAL ONCE
Status: COMPLETED | OUTPATIENT
Start: 2022-01-01 | End: 2022-01-01

## 2022-01-01 RX ORDER — POTASSIUM CHLORIDE 1500 MG/1
20 TABLET, EXTENDED RELEASE ORAL ONCE
Status: DISCONTINUED | OUTPATIENT
Start: 2022-01-01 | End: 2022-01-01

## 2022-01-01 RX ORDER — MORPHINE SULFATE 30 MG/1
5 TABLET ORAL EVERY 4 HOURS PRN
COMMUNITY

## 2022-01-01 RX ORDER — POLYETHYLENE GLYCOL 3350 17 G/17G
17 POWDER, FOR SOLUTION ORAL DAILY PRN
Status: DISCONTINUED | OUTPATIENT
Start: 2022-01-01 | End: 2022-01-01 | Stop reason: HOSPADM

## 2022-01-01 RX ORDER — LORAZEPAM 0.5 MG/1
0.5 TABLET ORAL EVERY 4 HOURS PRN
Qty: 10 TABLET | Refills: 0 | Status: SHIPPED | OUTPATIENT
Start: 2022-01-01

## 2022-01-01 RX ORDER — POTASSIUM CHLORIDE 1500 MG/1
40 TABLET, EXTENDED RELEASE ORAL ONCE
Status: COMPLETED | OUTPATIENT
Start: 2022-01-01 | End: 2022-01-01

## 2022-01-01 RX ORDER — ACETAMINOPHEN 650 MG/1
650 SUPPOSITORY RECTAL EVERY 6 HOURS PRN
Status: DISCONTINUED | OUTPATIENT
Start: 2022-01-01 | End: 2022-01-01 | Stop reason: HOSPADM

## 2022-01-01 RX ORDER — PANTOPRAZOLE SODIUM 40 MG/1
40 TABLET, DELAYED RELEASE ORAL
Status: DISCONTINUED | OUTPATIENT
Start: 2022-01-01 | End: 2022-01-01 | Stop reason: HOSPADM

## 2022-01-01 RX ORDER — ATROPINE SULFATE 10 MG/ML
1 SOLUTION/ DROPS OPHTHALMIC EVERY 4 HOURS PRN
Qty: 5 ML | Refills: 0 | Status: SHIPPED | OUTPATIENT
Start: 2022-01-01

## 2022-01-01 RX ORDER — AMOXICILLIN 250 MG
1 CAPSULE ORAL 2 TIMES DAILY PRN
DISCHARGE
Start: 2022-01-01

## 2022-01-01 RX ORDER — POTASSIUM CHLORIDE 7.45 MG/ML
10 INJECTION INTRAVENOUS
Status: DISPENSED | OUTPATIENT
Start: 2022-01-01 | End: 2022-01-01

## 2022-01-01 RX ORDER — DILTIAZEM HYDROCHLORIDE 5 MG/ML
15 INJECTION INTRAVENOUS ONCE
Status: COMPLETED | OUTPATIENT
Start: 2022-01-01 | End: 2022-01-01

## 2022-01-01 RX ORDER — AMOXICILLIN 250 MG
2 CAPSULE ORAL 2 TIMES DAILY PRN
Status: DISCONTINUED | OUTPATIENT
Start: 2022-01-01 | End: 2022-01-01 | Stop reason: HOSPADM

## 2022-01-01 RX ORDER — ACETAMINOPHEN 325 MG/1
650 TABLET ORAL EVERY 6 HOURS PRN
DISCHARGE
Start: 2022-01-01 | End: 2022-01-01 | Stop reason: DRUGHIGH

## 2022-01-01 RX ORDER — METOPROLOL TARTRATE 25 MG/1
25 TABLET, FILM COATED ORAL 2 TIMES DAILY
Status: DISCONTINUED | OUTPATIENT
Start: 2022-01-01 | End: 2022-01-01

## 2022-01-01 RX ORDER — PROCHLORPERAZINE MALEATE 5 MG
5 TABLET ORAL EVERY 6 HOURS PRN
Status: DISCONTINUED | OUTPATIENT
Start: 2022-01-01 | End: 2022-01-01 | Stop reason: HOSPADM

## 2022-01-01 RX ORDER — METOPROLOL TARTRATE 25 MG/1
25 TABLET, FILM COATED ORAL 2 TIMES DAILY
DISCHARGE
Start: 2022-01-01

## 2022-01-01 RX ORDER — DIPHENHYDRAMINE HCL 50 MG
50 CAPSULE ORAL
Status: DISCONTINUED | OUTPATIENT
Start: 2022-01-01 | End: 2022-01-01 | Stop reason: HOSPADM

## 2022-01-01 RX ORDER — ONDANSETRON 4 MG/1
4 TABLET, ORALLY DISINTEGRATING ORAL EVERY 6 HOURS PRN
Status: DISCONTINUED | OUTPATIENT
Start: 2022-01-01 | End: 2022-01-01 | Stop reason: HOSPADM

## 2022-01-01 RX ORDER — HYDROMORPHONE HYDROCHLORIDE 2 MG/1
1 TABLET ORAL
Qty: 10 TABLET | Refills: 0 | Status: SHIPPED | OUTPATIENT
Start: 2022-01-01

## 2022-01-01 RX ORDER — DIPHENHYDRAMINE HYDROCHLORIDE 50 MG/ML
50 INJECTION INTRAMUSCULAR; INTRAVENOUS
Status: DISCONTINUED | OUTPATIENT
Start: 2022-01-01 | End: 2022-01-01 | Stop reason: HOSPADM

## 2022-01-01 RX ORDER — METOPROLOL TARTRATE 25 MG/1
25 TABLET, FILM COATED ORAL 2 TIMES DAILY
Status: DISCONTINUED | OUTPATIENT
Start: 2022-01-01 | End: 2022-01-01 | Stop reason: HOSPADM

## 2022-01-01 RX ORDER — ONDANSETRON 4 MG/1
4 TABLET, ORALLY DISINTEGRATING ORAL EVERY 6 HOURS PRN
DISCHARGE
Start: 2022-01-01

## 2022-01-01 RX ORDER — ACETAMINOPHEN 325 MG/1
650 TABLET ORAL EVERY 6 HOURS PRN
Status: DISCONTINUED | OUTPATIENT
Start: 2022-01-01 | End: 2022-01-01 | Stop reason: HOSPADM

## 2022-01-01 RX ORDER — PROCHLORPERAZINE 25 MG
12.5 SUPPOSITORY, RECTAL RECTAL EVERY 12 HOURS PRN
Status: DISCONTINUED | OUTPATIENT
Start: 2022-01-01 | End: 2022-01-01 | Stop reason: HOSPADM

## 2022-01-01 RX ORDER — ACETAMINOPHEN 500 MG
1000 TABLET ORAL 3 TIMES DAILY
Qty: 90 TABLET | Refills: 1 | COMMUNITY
Start: 2022-01-01

## 2022-01-01 RX ORDER — METOPROLOL TARTRATE 25 MG/1
25 TABLET, FILM COATED ORAL 3 TIMES DAILY
Status: DISCONTINUED | OUTPATIENT
Start: 2022-01-01 | End: 2022-01-01

## 2022-01-01 RX ORDER — PANTOPRAZOLE SODIUM 40 MG/1
40 TABLET, DELAYED RELEASE ORAL
DISCHARGE
Start: 2022-01-01

## 2022-01-01 RX ORDER — BUPIVACAINE HYDROCHLORIDE 5 MG/ML
INJECTION, SOLUTION PERINEURAL
Status: COMPLETED
Start: 2022-01-01 | End: 2022-01-01

## 2022-01-01 RX ORDER — POTASSIUM CHLORIDE 1.5 G/1.58G
20 POWDER, FOR SOLUTION ORAL ONCE
Status: DISCONTINUED | OUTPATIENT
Start: 2022-01-01 | End: 2022-01-01

## 2022-01-01 RX ORDER — SODIUM CHLORIDE 9 MG/ML
INJECTION, SOLUTION INTRAVENOUS CONTINUOUS
Status: DISCONTINUED | OUTPATIENT
Start: 2022-01-01 | End: 2022-01-01

## 2022-01-01 RX ORDER — MAGNESIUM SULFATE HEPTAHYDRATE 40 MG/ML
2 INJECTION, SOLUTION INTRAVENOUS ONCE
Status: COMPLETED | OUTPATIENT
Start: 2022-01-01 | End: 2022-01-01

## 2022-01-01 RX ADMIN — POTASSIUM CHLORIDE 10 MEQ: 7.46 INJECTION, SOLUTION INTRAVENOUS at 17:12

## 2022-01-01 RX ADMIN — METOPROLOL TARTRATE 25 MG: 25 TABLET, FILM COATED ORAL at 21:01

## 2022-01-01 RX ADMIN — POTASSIUM CHLORIDE 10 MEQ: 7.46 INJECTION, SOLUTION INTRAVENOUS at 14:44

## 2022-01-01 RX ADMIN — POTASSIUM CHLORIDE AND SODIUM CHLORIDE: 900; 150 INJECTION, SOLUTION INTRAVENOUS at 11:43

## 2022-01-01 RX ADMIN — SODIUM CHLORIDE: 9 INJECTION, SOLUTION INTRAVENOUS at 19:38

## 2022-01-01 RX ADMIN — POTASSIUM CHLORIDE 40 MEQ: 1.5 POWDER, FOR SOLUTION ORAL at 12:48

## 2022-01-01 RX ADMIN — HUMAN ALBUMIN MICROSPHERES AND PERFLUTREN 9 ML: 10; .22 INJECTION, SOLUTION INTRAVENOUS at 10:17

## 2022-01-01 RX ADMIN — PANTOPRAZOLE SODIUM 40 MG: 40 TABLET, DELAYED RELEASE ORAL at 08:39

## 2022-01-01 RX ADMIN — PANTOPRAZOLE SODIUM 40 MG: 40 TABLET, DELAYED RELEASE ORAL at 06:30

## 2022-01-01 RX ADMIN — METOPROLOL TARTRATE 25 MG: 25 TABLET, FILM COATED ORAL at 21:25

## 2022-01-01 RX ADMIN — METOPROLOL TARTRATE 25 MG: 25 TABLET, FILM COATED ORAL at 10:07

## 2022-01-01 RX ADMIN — THIAMINE HYDROCHLORIDE 500 MG: 100 INJECTION, SOLUTION INTRAMUSCULAR; INTRAVENOUS at 22:35

## 2022-01-01 RX ADMIN — METOPROLOL TARTRATE 25 MG: 25 TABLET, FILM COATED ORAL at 21:37

## 2022-01-01 RX ADMIN — THIAMINE HYDROCHLORIDE 250 MG: 100 INJECTION, SOLUTION INTRAMUSCULAR; INTRAVENOUS at 13:52

## 2022-01-01 RX ADMIN — METOPROLOL TARTRATE 25 MG: 25 TABLET, FILM COATED ORAL at 21:14

## 2022-01-01 RX ADMIN — THIAMINE HYDROCHLORIDE 250 MG: 100 INJECTION, SOLUTION INTRAMUSCULAR; INTRAVENOUS at 08:44

## 2022-01-01 RX ADMIN — METOPROLOL TARTRATE 25 MG: 25 TABLET, FILM COATED ORAL at 08:09

## 2022-01-01 RX ADMIN — THIAMINE HYDROCHLORIDE 500 MG: 100 INJECTION, SOLUTION INTRAMUSCULAR; INTRAVENOUS at 16:19

## 2022-01-01 RX ADMIN — METOPROLOL TARTRATE 25 MG: 25 TABLET, FILM COATED ORAL at 10:49

## 2022-01-01 RX ADMIN — THIAMINE HYDROCHLORIDE 500 MG: 100 INJECTION, SOLUTION INTRAMUSCULAR; INTRAVENOUS at 10:57

## 2022-01-01 RX ADMIN — MAGNESIUM SULFATE HEPTAHYDRATE 2 G: 40 INJECTION, SOLUTION INTRAVENOUS at 14:44

## 2022-01-01 RX ADMIN — BUPIVACAINE HYDROCHLORIDE: 5 INJECTION, SOLUTION PERINEURAL at 22:21

## 2022-01-01 RX ADMIN — PHYTONADIONE 5 MG: 10 INJECTION, EMULSION INTRAMUSCULAR; INTRAVENOUS; SUBCUTANEOUS at 10:34

## 2022-01-01 RX ADMIN — METOPROLOL TARTRATE 25 MG: 25 TABLET, FILM COATED ORAL at 20:02

## 2022-01-01 RX ADMIN — THIAMINE HYDROCHLORIDE 250 MG: 100 INJECTION, SOLUTION INTRAMUSCULAR; INTRAVENOUS at 08:10

## 2022-01-01 RX ADMIN — POTASSIUM CHLORIDE 20 MEQ: 1.5 POWDER, FOR SOLUTION ORAL at 21:25

## 2022-01-01 RX ADMIN — DILTIAZEM HYDROCHLORIDE 5 MG/HR: 5 INJECTION, SOLUTION INTRAVENOUS at 10:57

## 2022-01-01 RX ADMIN — PANTOPRAZOLE SODIUM 40 MG: 40 TABLET, DELAYED RELEASE ORAL at 08:10

## 2022-01-01 RX ADMIN — CLOSTRIDIUM TETANI TOXOID ANTIGEN (FORMALDEHYDE INACTIVATED), CORYNEBACTERIUM DIPHTHERIAE TOXOID ANTIGEN (FORMALDEHYDE INACTIVATED), BORDETELLA PERTUSSIS TOXOID ANTIGEN (GLUTARALDEHYDE INACTIVATED), BORDETELLA PERTUSSIS FILAMENTOUS HEMAGGLUTININ ANTIGEN (FORMALDEHYDE INACTIVATED), BORDETELLA PERTUSSIS PERTACTIN ANTIGEN, AND BORDETELLA PERTUSSIS FIMBRIAE 2/3 ANTIGEN 0.5 ML: 5; 2; 2.5; 5; 3; 5 INJECTION, SUSPENSION INTRAMUSCULAR at 22:19

## 2022-01-01 RX ADMIN — METOPROLOL TARTRATE 25 MG: 25 TABLET, FILM COATED ORAL at 11:53

## 2022-01-01 RX ADMIN — METOPROLOL TARTRATE 25 MG: 25 TABLET, FILM COATED ORAL at 22:35

## 2022-01-01 RX ADMIN — POTASSIUM CHLORIDE 10 MEQ: 7.46 INJECTION, SOLUTION INTRAVENOUS at 15:43

## 2022-01-01 RX ADMIN — PANTOPRAZOLE SODIUM 40 MG: 40 TABLET, DELAYED RELEASE ORAL at 06:28

## 2022-01-01 RX ADMIN — POTASSIUM CHLORIDE 40 MEQ: 1500 TABLET, EXTENDED RELEASE ORAL at 19:38

## 2022-01-01 RX ADMIN — THIAMINE HYDROCHLORIDE 250 MG: 100 INJECTION, SOLUTION INTRAMUSCULAR; INTRAVENOUS at 10:36

## 2022-01-01 RX ADMIN — PHYTONADIONE 5 MG: 10 INJECTION, EMULSION INTRAMUSCULAR; INTRAVENOUS; SUBCUTANEOUS at 10:57

## 2022-01-01 RX ADMIN — POTASSIUM CHLORIDE 20 MEQ: 1.5 POWDER, FOR SOLUTION ORAL at 08:39

## 2022-01-01 RX ADMIN — THIAMINE HYDROCHLORIDE 500 MG: 100 INJECTION, SOLUTION INTRAMUSCULAR; INTRAVENOUS at 16:24

## 2022-01-01 RX ADMIN — METOPROLOL TARTRATE 25 MG: 25 TABLET, FILM COATED ORAL at 08:39

## 2022-01-01 RX ADMIN — BNT162B2 30 MCG: 0.23 INJECTION, SUSPENSION INTRAMUSCULAR at 16:23

## 2022-01-01 RX ADMIN — DILTIAZEM HYDROCHLORIDE 5 MG/HR: 5 INJECTION, SOLUTION INTRAVENOUS at 15:37

## 2022-01-01 RX ADMIN — METOPROLOL TARTRATE 25 MG: 25 TABLET, FILM COATED ORAL at 10:36

## 2022-01-01 RX ADMIN — THIAMINE HYDROCHLORIDE 500 MG: 100 INJECTION, SOLUTION INTRAMUSCULAR; INTRAVENOUS at 10:00

## 2022-01-01 RX ADMIN — THIAMINE HYDROCHLORIDE 500 MG: 100 INJECTION, SOLUTION INTRAMUSCULAR; INTRAVENOUS at 21:19

## 2022-01-01 RX ADMIN — PHYTONADIONE 5 MG: 10 INJECTION, EMULSION INTRAMUSCULAR; INTRAVENOUS; SUBCUTANEOUS at 08:40

## 2022-01-01 RX ADMIN — METOPROLOL TARTRATE 25 MG: 25 TABLET, FILM COATED ORAL at 16:14

## 2022-01-01 RX ADMIN — PANTOPRAZOLE SODIUM 40 MG: 40 TABLET, DELAYED RELEASE ORAL at 10:07

## 2022-01-01 RX ADMIN — THIAMINE HYDROCHLORIDE 250 MG: 100 INJECTION, SOLUTION INTRAMUSCULAR; INTRAVENOUS at 10:49

## 2022-01-01 RX ADMIN — POTASSIUM CHLORIDE 40 MEQ: 1.5 POWDER, FOR SOLUTION ORAL at 16:19

## 2022-01-01 RX ADMIN — SODIUM CHLORIDE 1000 ML: 9 INJECTION, SOLUTION INTRAVENOUS at 13:48

## 2022-01-01 RX ADMIN — DILTIAZEM HYDROCHLORIDE 15 MG: 5 INJECTION INTRAVENOUS at 15:21

## 2022-01-01 RX ADMIN — POTASSIUM CHLORIDE 40 MEQ: 1.5 POWDER, FOR SOLUTION ORAL at 06:45

## 2022-01-01 ASSESSMENT — ACTIVITIES OF DAILY LIVING (ADL)
ADLS_ACUITY_SCORE: 46
ADLS_ACUITY_SCORE: 43
ADLS_ACUITY_SCORE: 35
ADLS_ACUITY_SCORE: 45
ADLS_ACUITY_SCORE: 46
ADLS_ACUITY_SCORE: 43
ADLS_ACUITY_SCORE: 46
ADLS_ACUITY_SCORE: 44
ADLS_ACUITY_SCORE: 45
ADLS_ACUITY_SCORE: 49
WEAR_GLASSES_OR_BLIND: NO
ADLS_ACUITY_SCORE: 45
ADLS_ACUITY_SCORE: 39
ADLS_ACUITY_SCORE: 43
ADLS_ACUITY_SCORE: 43
ADLS_ACUITY_SCORE: 39
ADLS_ACUITY_SCORE: 45
ADLS_ACUITY_SCORE: 39
ADLS_ACUITY_SCORE: 43
ADLS_ACUITY_SCORE: 43
ADLS_ACUITY_SCORE: 45
ADLS_ACUITY_SCORE: 37
CHANGE_IN_FUNCTIONAL_STATUS_SINCE_ONSET_OF_CURRENT_ILLNESS/INJURY: YES
ADLS_ACUITY_SCORE: 43
ADLS_ACUITY_SCORE: 46
ADLS_ACUITY_SCORE: 43
ADLS_ACUITY_SCORE: 43
ADLS_ACUITY_SCORE: 45
ADLS_ACUITY_SCORE: 45
ADLS_ACUITY_SCORE: 47
ADLS_ACUITY_SCORE: 43
ADLS_ACUITY_SCORE: 43
ADLS_ACUITY_SCORE: 45
ADLS_ACUITY_SCORE: 31
ADLS_ACUITY_SCORE: 45
ADLS_ACUITY_SCORE: 31
ADLS_ACUITY_SCORE: 43
ADLS_ACUITY_SCORE: 49
ADLS_ACUITY_SCORE: 39
ADLS_ACUITY_SCORE: 35
ADLS_ACUITY_SCORE: 43
ADLS_ACUITY_SCORE: 45
ADLS_ACUITY_SCORE: 44
ADLS_ACUITY_SCORE: 46
ADLS_ACUITY_SCORE: 43
ADLS_ACUITY_SCORE: 43
TOILETING_ISSUES: YES
ADLS_ACUITY_SCORE: 31
ADLS_ACUITY_SCORE: 47
TOILETING_ASSISTANCE: TOILETING DIFFICULTY, ASSISTANCE 1 PERSON
ADLS_ACUITY_SCORE: 31
ADLS_ACUITY_SCORE: 43
ADLS_ACUITY_SCORE: 43
DOING_ERRANDS_INDEPENDENTLY_DIFFICULTY: YES
ADLS_ACUITY_SCORE: 39
ADLS_ACUITY_SCORE: 45
ADLS_ACUITY_SCORE: 35
ADLS_ACUITY_SCORE: 39
ADLS_ACUITY_SCORE: 43
HEARING_DIFFICULTY_OR_DEAF: NO
ADLS_ACUITY_SCORE: 43
DRESSING/BATHING_DIFFICULTY: YES
ADLS_ACUITY_SCORE: 43
ADLS_ACUITY_SCORE: 43
WALKING_OR_CLIMBING_STAIRS: AMBULATION DIFFICULTY, ASSISTANCE 1 PERSON
ADLS_ACUITY_SCORE: 46
DRESSING/BATHING: BATHING DIFFICULTY, ASSISTANCE 1 PERSON
ADLS_ACUITY_SCORE: 43
ADLS_ACUITY_SCORE: 46
ADLS_ACUITY_SCORE: 39
ADLS_ACUITY_SCORE: 43
ADLS_ACUITY_SCORE: 43
ADLS_ACUITY_SCORE: 44
ADLS_ACUITY_SCORE: 46
ADLS_ACUITY_SCORE: 43
ADLS_ACUITY_SCORE: 49
ADLS_ACUITY_SCORE: 38
DIFFICULTY_EATING/SWALLOWING: NO
ADLS_ACUITY_SCORE: 38
ADLS_ACUITY_SCORE: 31
NUMBER_OF_TIMES_PATIENT_HAS_FALLEN_WITHIN_LAST_SIX_MONTHS: 2
ADLS_ACUITY_SCORE: 43
FALL_HISTORY_WITHIN_LAST_SIX_MONTHS: YES
ADLS_ACUITY_SCORE: 43
ADLS_ACUITY_SCORE: 49
ADLS_ACUITY_SCORE: 43
ADLS_ACUITY_SCORE: 46
ADLS_ACUITY_SCORE: 45
ADLS_ACUITY_SCORE: 45
ADLS_ACUITY_SCORE: 39
WALKING_OR_CLIMBING_STAIRS_DIFFICULTY: NO
ADLS_ACUITY_SCORE: 45
ADLS_ACUITY_SCORE: 49
ADLS_ACUITY_SCORE: 45
ADLS_ACUITY_SCORE: 31
ADLS_ACUITY_SCORE: 45
ADLS_ACUITY_SCORE: 39
CONCENTRATING,_REMEMBERING_OR_MAKING_DECISIONS_DIFFICULTY: YES
ADLS_ACUITY_SCORE: 37
ADLS_ACUITY_SCORE: 45

## 2022-01-01 ASSESSMENT — ENCOUNTER SYMPTOMS
APPETITE CHANGE: 1
HEADACHES: 0
PALPITATIONS: 1
NAUSEA: 0
CONFUSION: 1
FEVER: 0
SHORTNESS OF BREATH: 0
NECK PAIN: 0
VOMITING: 0
WOUND: 1

## 2022-07-27 PROBLEM — E87.6 HYPOKALEMIA: Status: ACTIVE | Noted: 2022-01-01

## 2022-07-27 PROBLEM — I48.91 ATRIAL FIBRILLATION WITH RVR (H): Status: ACTIVE | Noted: 2022-01-01

## 2022-07-27 NOTE — H&P
[facility]    History and Physical  Hospitalist       Date of Admission:  7/27/2022    Assessment & Plan      This is a 76-year-old female with history of hypertension, hyperlipidemia, GERD, osteoporosis, history of DVT versus superficial venous thrombosis in the past, used to be not on chronic anticoagulation.  Not on any medication for more than a year.  Was brought to the ED as she was found on the floor.    ASSESSMENT AND PLAN:    1.  Atrial fibrillation with rapid ventricular response:  This is possibly a new onset AFib with RVR as she was not taking any of her medications, including lisinopril and metoprolol for hypertension.  Her heart rate was up to 160s when she came in.  She was given intravenous Cardizem infusion and the heart rate is now coming down.  For now, we will continue Cardizem infusion, restart her metoprolol 25 mg b.i.d., do troponins and echocardiogram and check TSH.  She is high risk for stroke and her CHADS-VASc score is more than 2, but given her age, dementia, risk for fall, family has decided not to start her on anticoagulation at this time.  Checking on her INR, she has already had an elevated INR of 2.13 for some reason.  She is not on any anticoagulation at this time, as per family.  2.  Hypertension, uncontrolled.  This is secondary to noncompliance with medication.  She is not taking any of her medications including lisinopril and metoprolol for the last 1 year.  Restart metoprolol for now for rate control.  She is on IV Cardizem.  We will adjust the medication as she is in the hospital if needed.  3.  Severe hypokalemia, likely because of malnutrition, not eating much.  We will start her on electrolyte replacement protocol.  4.  Cognitive impairment, likely dementia.  The patient most likely has severe dementia and has been getting worse.  There is no obvious sign of any stroke at this point.  She is able to move all other extremities and follow commands  and answer simple questions.  CT scan of the head done in the ED negative for any acute intracranial abnormality.  I will have OT or PT do the mini mental status exam and screening memory testing.  Family is requesting to have Neurology see her.  We will consult General Neurology to evaluate her.  Most likely has dementia and is worsening.  May benefit from initiating medication, but she will need official neuropsych evaluation as an outpatient basis, but I think she will do very poorly as she is unable to answer any of my questions.  5.  Hyperlipidemia. Used to be on Crestor.  We will hold it for now.   6.  GERD, on Prilosec.  We will restart that.   7.  Coagulopathy:  Is uncertain why she has been coagulopathic.  She does have a history of alcohol abuse recently, but stopped drinking for the last 2 months.  I will check an LFT. INR is 2.13, so no need for any anticoagulation at this time.  COVID-19 screen negative.  8.  DVT prophylaxis with SCDs.    CODE STATUS:  I had a detailed discussion regarding her code status with the family and they wanted to keep her DNR/DNI.  The patient will need PT, OT evaluation.  They need a  for the safe disposition as I do not think that she is safe living by herself.  She does not need 24-hour care given her severe dementia on her screening exam by myself.  We will have them evaluate her.  We will see how she does.    The case was discussed with ER physician, the patient's family, son and daughter and the nursing staff taking care of the patient.    Colton Kam MD    DVT Prophylaxis: Pneumatic Compression Devices  Code Status: DNR / DNI    Disposition: Expected discharge in 2 days once stable    Colton Kam MD, MD    Primary Care Physician   Physician No Ref-Primary    Chief Complaint   Found on the floor     History is obtained from the patient and the patient's family     History of Present Illness    This is a 76-year-old female with history of hypertension,  hyperlipidemia, GERD, osteoporosis, history of DVT versus superficial venous thrombosis in the past, used to be not on chronic anticoagulation.  Not on any medication for more than a year.  Was brought to the ED as she was found on the floor.    The patient is a very poor historian and has baseline memory issues, unable to provide any history.  Most of the history is obtained from medical records, talking to the ER physician and talking to the patient and family, son and daughter who were present at the bedside.  According to them, for the last 1 year she has issues with memory and they had to move her out from her home to the apartment and she is living independently in the home still up to last few months and her memory has been worsening.  She does not remember most of the things.  Unable to eat or drink by herself either, so somebody stops by at her place to keep an eye on her.  They even give her prepared food, but she is not eating much.  She recently was drinking a lot of alcohol, mostly beer, but for the last 2 months she is not drinking any, not beer either.  She was visited by her friend and she was found on the floor, so her friend called her daughter who went there and was unable to get her up, so she called EMS and they brought her to the ED.    The patient was found to be in AFib with RVR, mildly confused and was hypertensive.  At this time, the patient told me that she has been doing well.  She does not know why she is in the hospital.  She denies any headache, dizziness, lightheadedness.  No fever, chills or cough.  No chest pain, abdominal pain, back pain, dysuria, hematuria, constipation, diarrhea.  She can answer direct questions, but is unable to provide much details.  She does not remember we will see how she fell down or why was she on the floor.  She had no idea where is she right now.  She does remember her name, but not date and time.  Her memory has been worsening, as per the family, really  rapidly in the last 6 months.  She lost some weight as well.    She has an upcoming appointment with [facility] to evaluate for memory next month as well.  Rest of the review of systems is unobtainable at this time.    The patient was found to be in AFib with RVR.  She was started on Cardizem drip and fusion and heart rate has been getting better now.  Hospitalist service was consulted to admit the patient.    Past Medical History    I have reviewed this patient's medical history and updated it with pertinent information if needed.   Past Medical History:   Diagnosis Date     Esophageal reflux      Long term (current) use of anticoagulants      Need for prophylactic hormone replacement therapy (postmenopausal)     EVISTA     Phlebitis and thrombophlebitis of other deep vessels of lower extremities 2005     Rosacea      Unspecified essential hypertension        Past Surgical History   I have reviewed this patient's surgical history and updated it with pertinent information if needed.  Past Surgical History:   Procedure Laterality Date     [facility] TOTAL KNEE ARTHROPLASTY  2005    right       Prior to Admission Medications   Prior to Admission Medications   Prescriptions Last Dose Informant Patient Reported? Taking?   lisinopril (ZESTRIL) 40 MG tablet   No No   Sig: Take 1 tablet (40 mg) by mouth daily   magic mouthwash (ENTER INGREDIENTS IN COMMENTS) suspension   No No   Sig: Swish and spit 5-10 mLs in mouth every 6 hours as needed compound 30 ml Benadryl (12.5 mg/5 ml), 60 ml Maalox and 30 ml Viscous Lidocaine   metoprolol tartrate (LOPRESSOR) 25 MG tablet   No No   Sig: Take 1 tablet (25 mg) by mouth 2 times daily   omeprazole (PRILOSEC) 20 MG DR capsule   No No   Sig: TAKE 1 CAPSULE BY MOUTH EVERY DAY   raloxifene (EVISTA) 60 MG tablet   No No   Sig: Take 1 tablet (60 mg) by mouth daily   rosuvastatin (CRESTOR) 10 MG tablet   No No   Sig: TAKE 1 TABLET BY MOUTH EVERY DAY      Facility-Administered Medications: None      Allergies   Allergies   Allergen Reactions     Penicillins Anaphylaxis     Face became swollen in her teenage years when she took PCN       Social History   I have reviewed this patient's social history and updated it with pertinent information if needed. Zahra Pulido  reports that she quit smoking about 53 years ago. Her smoking use included cigarettes. She has a 6.00 pack-year smoking history. She has never used smokeless tobacco. She reports current alcohol use. She reports that she does not use drugs.    Family History   I have reviewed this patient's family history and updated it with pertinent information if needed.   Family History   Problem Relation Age of Onset     Arthritis Mother         rheumatoid arthritis     Cancer Father         pancreatic       Review of Systems  not reliable given cognitive impairment.   CONSTITUTIONAL:  negative  EYES:  negative  HEENT:  negative  RESPIRATORY:  negative  CARDIOVASCULAR:  negative  GASTROINTESTINAL:  negative  GENITOURINARY:  negative  INTEGUMENT/BREAST:  negative  HEMATOLOGIC/LYMPHATIC:  negative  ALLERGIC/IMMUNOLOGIC:  negative  ENDOCRINE:  negative  MUSCULOSKELETAL:  negative  NEUROLOGICAL:  negative  BEHAVIOR/PSYCH:  negative    Physical Exam   Temp: 97.4  F (36.3  C) Temp src: Temporal BP: 124/76 Pulse: 90   Resp: 19 SpO2: 97 % O2 Device: None (Room air)    Vital Signs with Ranges  Temp:  [97.4  F (36.3  C)] 97.4  F (36.3  C)  Pulse:  [] 90  Resp:  [12-25] 19  BP: (124-160)/() 124/76  SpO2:  [91 %-99 %] 97 %  0 lbs 0 oz    Constitutional: Awake, alert, cooperative, no apparent distress.  Eyes: Conjunctiva and pupils examined and normal.  HEENT: coated tongue,   Respiratory: Clear to auscultation bilaterally, no crackles or wheezing.  Cardiovascular:Irregularly irregular rhythm, tachycaria, normal S1 and S2, and no murmur noted.  GI: Soft, non-distended, non-tender, normal bowel sounds.  Lymph/Hematologic: No anterior cervical or  supraclavicular adenopathy.  Skin: No rashes, no cyanosis, no edema.  Musculoskeletal: No joint swelling, erythema or tenderness.  Neurologic:no focal deficit, moving all 4 extremities.   Psychiatric: Alert, oriented to person, place and time, no obvious anxiety or depression.    Data   Data reviewed today:  I personally reviewed the EKG tracing showing Afib with RVT rate 113, no acute ischemic changes .  Recent Labs   Lab 07/27/22  1346   WBC 12.6*   HGB 15.3   MCV 84      INR 2.13*   *   POTASSIUM 2.2*   CHLORIDE 91*   CO2 30   BUN 17   CR 0.75   ANIONGAP 11   VIRGILIO 8.9   *       Recent Results (from the past 24 hour(s))   CT Head w/o Contrast    Narrative    CT SCAN OF THE HEAD WITHOUT CONTRAST  July 27, 2022 2:22 PM     HISTORY: Found on ground with altered mental status.    TECHNIQUE: Axial images of the head and coronal reformations without  IV contrast material. Radiation dose for this scan was reduced using  automated exposure control, adjustment of the mA and/or kV according  to patient size, or iterative reconstruction technique.    COMPARISON: None.    FINDINGS: Mild volume loss is present. White matter hypoattenuation  likely represents mild chronic small vessel ischemic change. The  cerebral hemispheres, brainstem, and cerebellum otherwise demonstrate  normal morphology and attenuation. No evidence of acute ischemia,  hemorrhage, mass, mass effect or hydrocephalus. The visualized  calvarium, tympanic cavities, mastoid cavities, and paranasal sinuses  are unremarkable. Nonspecific soft tissue filling the right external  auditory canal, presumably cerumen.      Impression    IMPRESSION: No acute intracranial abnormality.    ATIYA ARMSTRONG MD         SYSTEM ID:  [id number]   XR Chest Port 1 View    Narrative    XR CHEST PORT 1 VIEW   7/27/2022 2:29 PM     HISTORY: palpitations    COMPARISON: None available.      Impression    IMPRESSION: Patient is rotated to the right. Within this  limitation,  cardiomediastinal silhouette is normal in size. There is a large  hiatal hernia. No focal airspace disease, pleural effusion or  pneumothorax. No acute bony abnormality.    FAUZIA MIR MD         SYSTEM ID:  [id number]

## 2022-07-27 NOTE — ED NOTES
[facility]  ED Nurse Handoff Report    ED Chief complaint: Altered Mental Status      ED Diagnosis:   Final diagnoses:   Atrial fibrillation with RVR (H)   Hypokalemia       Code Status: Hospital MD to address     Allergies:   Allergies   Allergen Reactions     Penicillins Anaphylaxis     Face became swollen in her teenage years when she took PCN       Patient Story: Pt presents from home where she lives alone but is unable to care for herself. Pt has had cognitive decline over the past 6 months per family but has not been doing well for over a year. Family states she cannot cook for herself, cannot bathe, does not eat and does not drink. They have been trying to make an appointment with a neurologist to get her additional help but unable to get in. Today, a friend had not heard from her and so they went to the house and found her sitting on the ground and she was unable to get up. Pt was too weak and found to be in afib rvr. Family states pt stopped taking all her heart and other meds over a year ago because she forgot to take them. Pt appears very dehydrated, her lips are dry and cracked, skin dry and bruising to under the thighs. Pt given 1 liter IVF, critical potassium of 2.2 and getting replacement. Pt given cardizem for afib rvr.   Focused Assessment:  Disoriented x4, full cares, unable to perform ADLs, afib rvr with some ectopy     Treatments and/or interventions provided: labs, imaging, med administration, ekg  Patient's response to treatments and/or interventions: tolerating well     To be done/followed up on inpatient unit:  inpt orders, cards/neuro/social work consult    Does this patient have any cognitive concerns?: Alzheimer's, Forgetful, Disoriented to time, Disoriented to place, Disoriented to situation and Disorientation to person    Activity level - Baseline/Home:  Independent  Activity Level - Current:   Total Care    Patient's Preferred language: English   Needed?:  No    Isolation: None  Infection: Not Applicable  Patient tested for COVID 19 prior to admission: YES  Bariatric?: No    Vital Signs:   Vitals:    07/27/22 1350 07/27/22 1400 07/27/22 1500 07/27/22 1510   BP: (!) 130/115 (!) 160/104 (!) 125/90    Pulse: (!) 130 (!) 161 107 118   Resp: 21  16 16   Temp:       TempSrc:       SpO2: 99%  97% 97%       Cardiac Rhythm:     Was the PSS-3 completed:   Yes  What interventions are required if any?               Family Comments: family present  OBS brochure/video discussed/provided to patient/family: N/A              Name of person given brochure if not patient: n/a              Relationship to patient: n/a    For the majority of the shift this patient's behavior was Green.   Behavioral interventions performed were none.    ED NURSE PHONE NUMBER: *[phone]

## 2022-07-27 NOTE — ED NOTES
Bed: ED13  Expected date:   Expected time:   Means of arrival:   Comments:  [facility] - 76 F altered eta 3294

## 2022-07-27 NOTE — ED TRIAGE NOTES
Pt BIBA from home where she lives alone. Patient's friend was unable to get a hold of patient for a day and went to patient's home. Friend last spoke to patient yesterday morning at 10am where she did not seem herself. Friend found patient sitting upright on the floor- unsure if there was a fall. Over the last week or so per family report patient has had a decline in mental status and overall health- increasing SOB. Patient has probable dementia, has upcoming Geriatric psych appt in August. Patient was in rapid A-fib on monitor for EMS, blood sugar was 176. Patient had systolic BP of 87 for EMS and was given 500mL of IVF.

## 2022-07-27 NOTE — PROGRESS NOTES
RECEIVING UNIT ED HANDOFF REVIEW    ED Nurse Handoff Report was reviewed by: Eneida Nava on July 27, 2022 at 5:19 PM

## 2022-07-27 NOTE — PHARMACY-ADMISSION MEDICATION HISTORY
"Pharmacy Medication History  Admission medication history interview status for the 7/27/2022  admission is complete. See EPIC admission navigator for prior to admission medications     Location of Interview: Phone  Medication history sources: Patient's family/friend (daughter)    Significant changes made to the medication list:  Removed all medications per patient's daughter who believes the patient has not been taking any of her medications for \"years\".    Medications removed include:  Lisinopril 40 mg by mouth daily  Magic mouthwash  Metoprolol tartrate 25 mg by mouth twice daily  Omeprazole 20 mg by mouth daily  Raloxifene 60 mg by mouth daily  Rosuvastatin 10 mg by mouth daily    In the past week, patient estimated taking medication this percent of the time: less than 50% due to other    Additional medication history information:   None    Medication reconciliation completed by provider prior to medication history? No    Time spent in this activity: 10 minutes    Prior to Admission medications    Not on File       The information provided in this note is only as accurate as the sources available at the time of update(s)   "

## 2022-07-27 NOTE — ED NOTES
Pt has some old bruising to posterior thighs and has some redness but no skin breakdown noted. Pt has extremely dry skin and lips are chapped, scabbed and extremely dry as well

## 2022-07-27 NOTE — ED TRIAGE NOTES
Triage Assessment     Row Name 07/27/22 1338       Triage Assessment (Adult)    Airway WDL WDL       Respiratory WDL    Respiratory WDL WDL  respirations even and unlabored at this time- pt has been more SOB over last week per family's report       Skin Circulation/Temperature WDL    Skin Circulation/Temperature WDL X  bruises to right thigh       Cardiac WDL    Cardiac WDL X;rhythm    Cardiac Rhythm tachycardic;apical pulse irregular       Peripheral/Neurovascular WDL    Peripheral Neurovascular WDL WDL       Cognitive/Neuro/Behavioral WDL    Cognitive/Neuro/Behavioral WDL X    Level of Consciousness confused    Arousal Level opens eyes spontaneously    Orientation disoriented x 4       [place] Coma Scale    Best Eye Response 4-->(E4) spontaneous    Best Motor Response 4-->(M4) withdraws from pain    Best Verbal Response 4-->(V4) confused    [place] Coma Scale Score 12

## 2022-07-27 NOTE — H&P
Admitted: 07/27/2022    HISTORY OF PRESENT ILLNESS:  This is a 76-year-old female with history of hypertension, hyperlipidemia, GERD, osteoporosis, history of DVT versus superficial venous thrombosis in the past, used to be not on chronic anticoagulation.  Not on any medication for more than a year.  Was brought to the ED as she was found on the floor.    The patient is a very poor historian and has baseline memory issues, unable to provide any history.  Most of the history is obtained from medical records, talking to the ER physician and talking to the patient and family, son and daughter who were present at the bedside.  According to them, for the last 1 year she has issues with memory and they had to move her out from her home to the apartment and she is living independently in the home still up to last few months and her memory has been worsening.  She does not remember most of the things.  Unable to eat or drink by herself either, so somebody stops by at her place to keep an eye on her.  They even give her prepared food, but she is not eating much.  She recently was drinking a lot of alcohol, mostly beer, but for the last 2 months she is not drinking any, not beer either.  She was visited by her friend and she was found on the floor, so her friend called her daughter who went there and was unable to get her up, so she called EMS and they brought her to the ED.    The patient was found to be in AFib with RVR, mildly confused and was hypertensive.  At this time, the patient told me that she has been doing well.  She does not know why she is in the hospital.  She denies any headache, dizziness, lightheadedness.  No fever, chills or cough.  No chest pain, abdominal pain, back pain, dysuria, hematuria, constipation, diarrhea.  She can answer direct questions, but is unable to provide much details.  She does not remember we will see how she fell down or why was she on the floor.  She had no idea where is she right  now.  She does remember her name, but not date and time.  Her memory has been worsening, as per the family, really rapidly in the last 6 months.  She lost some weight as well.    She has an upcoming appointment with [facility] to evaluate for memory next month as well.  Rest of the review of systems is unobtainable at this time.    The patient was found to be in AFib with RVR.  She was started on Cardizem drip and fusion and heart rate has been getting better now.  Hospitalist service was consulted to admit the patient.    ASSESSMENT AND PLAN:    1.  Atrial fibrillation with rapid ventricular response:  This is possibly a new onset AFib with RVR as she was not taking any of her medications, including lisinopril and metoprolol for hypertension.  Her heart rate was up to 160s when she came in.  She was given intravenous Cardizem infusion and the heart rate is now coming down.  For now, we will continue Cardizem infusion, restart her metoprolol 25 mg b.i.d., do troponins and echocardiogram and check TSH.  She is high risk for stroke and her CHADS-VASc score is more than 2, but given her age, dementia, risk for fall, family has decided not to start her on anticoagulation at this time.  Checking on her INR, she has already had an elevated INR of 2.13 for some reason.  She is not on any anticoagulation at this time, as per family.  2.  Hypertension, uncontrolled.  This is secondary to noncompliance with medication.  She is not taking any of her medications including lisinopril and metoprolol for the last 1 year.  Restart metoprolol for now for rate control.  She is on IV Cardizem.  We will adjust the medication as she is in the hospital if needed.  3.  Severe hypokalemia, likely because of malnutrition, not eating much.  We will start her on electrolyte replacement protocol.  4.  Cognitive impairment, likely dementia.  The patient most likely has severe dementia and has been getting worse.  There is no obvious sign of any  stroke at this point.  She is able to move all other extremities and follow commands and answer simple questions.  CT scan of the head done in the ED negative for any acute intracranial abnormality.  I will have OT or PT do the mini mental status exam and screening memory testing.  Family is requesting to have Neurology see her.  We will consult General Neurology to evaluate her.  Most likely has dementia and is worsening.  May benefit from initiating medication, but she will need official neuropsych evaluation as an outpatient basis, but I think she will do very poorly as she is unable to answer any of my questions.  5.  Hyperlipidemia. Used to be on Crestor.  We will hold it for now.   6.  GERD, on Prilosec.  We will restart that.   7.  Coagulopathy:  Is uncertain why she has been coagulopathic.  She does have a history of alcohol abuse recently, but stopped drinking for the last 2 months.  I will check an LFT. INR is 2.13, so no need for any anticoagulation at this time.  COVID-19 screen negative.  8.  DVT prophylaxis with SCDs.    CODE STATUS:  I had a detailed discussion regarding her code status with the family and they wanted to keep her DNR/DNI.  The patient will need PT, OT evaluation.  They need a  for the safe disposition as I do not think that she is safe living by herself.  She does not need 24-hour care given her severe dementia on her screening exam by myself.  We will have them evaluate her.  We will see how she does.    The case was discussed with ER physician, the patient's family, son and daughter and the nursing staff taking care of the patient.    Colton Kam MD        D: 2022   T: 2022   MT: [id number]    Name:     TRUPTI NASH  MRN:      [id number]        Account:     [id number]   :      1945           Admitted:    2022       Document: [id number]

## 2022-07-27 NOTE — PLAN OF CARE
Alert but otherwise completely disoriented x 4. VS stable, on RA and no complaints or signs of pain. Tele A fib CVR/RVR with rates in the 's. MD paged about critical K+ and lactic, potassium replacement ordered and will be continued. She will have Echo, Neuro consult, PT and OT tomorrow. Patient's daughter and son will be present tomorrow to help answer questions.

## 2022-07-28 NOTE — PROGRESS NOTES
Sepsis Evaluation Progress Note    I was called to see Zahra Pulido due to abnormal vital signs triggering the Sepsis SIRS screening alert. She is not known to have an infection.     Physical Exam   Vital Signs:  Temp: 97.4  F (36.3  C) Temp src: Temporal BP: 114/80 Pulse: 93   Resp: 16 SpO2: 96 % O2 Device: None (Room air)         Data   Lactic Acid   Date Value Ref Range Status   07/27/2022 2.8 (H) 0.7 - 2.0 mmol/L Final       Assessment & Plan   NO EVIDENCE OF SEPSIS at this time.  Vital sign, physical exam, and lab findings are due to afib with RVR.    Disposition: The patient will remain on the current unit. We will continue to monitor this patient closely..  Marco A Robison MD    Sepsis Criteria   Sepsis: 2+ SIRS criteria due to infection  Severe Sepsis: Sepsis AND 1+ new sign of acute organ dysfunction (Note: lactate >2 or acute encephalopathy each qualify as organ dysfunction)  Septic Shock: Sepsis AND hypotension despite volume resuscitation with 30 ml/kg crystalloid or lactate >=4  Note: HYPOTENSION is defined as 2 BP readings measured 3 hrs apart that have a SBP <90, MAP <65, or decrease >40 mmHg, occurring 6 hrs before or after t-zero

## 2022-07-28 NOTE — PROGRESS NOTES
07/28/22 1500   Quick Adds   Type of Visit Initial Occupational Therapy Evaluation   Living Environment   People in Home alone   Current Living Arrangements apartment   Home Accessibility no concerns   Transportation Anticipated family or friend will provide   Living Environment Comments [place]. Her daughter and son live in [facility] and involved in care but both work FT.  No services available on-site and there are no security measures or adaptive devices (grab bars, pull cords for help, raised toilet seats,etc) available.   Self-Care   Equipment Currently Used at Home none   Fall history within last six months yes   Number of times patient has fallen within last six months 2  (2 that her children know of)   Activity/Exercise/Self-Care Comment Per children: pt has deteriorated cognitively over past year and over last month rapidly worsening. No longer able to select or dress herself, poor toileting/hygiene habits, they bring food and it sits out and doesn't get eaten. She can no longer take set-up meds.  Her apartment has no AE in place to improve safety.   General Information   Onset of Illness/Injury or Date of Surgery 07/27/22   Referring Physician Dr. Blank Navarro   Patient/Family Therapy Goal Statement (OT) Daughter/son: pt unable to return home, looking for recommendations   Additional Occupational Profile Info/Pertinent History of Current Problem 77yo female admitted following fall at her apartment, worsening weakness and cognitive function, found to be in Afib with RVR.  OT ordered to assess cogntive function, ability to manage safely in home setting.   Existing Precautions/Restrictions fall   General Observations and Info Pt in bed upon arrival, awake, son and daughter present.   Cognitive Status Examination   Orientation Status person   Affect/Mental Status (Cognitive) confused;flat/blunted affect   Follows Commands follows one-step commands;25-49% accuracy   Safety  "Deficit severe deficit;ability to follow commands;at risk behavior observed;awareness of need for assistance;insight into deficits/self-awareness;judgment;safety precautions awareness;safety precautions follow-through/compliance   Cognitive Status Comments Oriented to self, month and date of birth not year, otherwise disoriented to current date, place. Her son and daughter present and she could not identify the relationship stating they were friends, did not names. Did not state correct name nor demonstrate correct use of common object such as toothbrush, keys, spoon. She stated comb was a \"mirror\" but did use that object correctly.  Deficits noted with motor skill execution and visuospatial tasks  during ambulatory tasks   Cognitive Screens/Assessments   Cognitive Assessments Completed SLUMS  (Attempted SLUMS however pt able to answer one orientation correctly (state) and not able to to initiate any other task.)   [facility] Mental Status Exam (UMS):  Total Score out of /30 1   UMS Norms 1-20 equals dementia   SLUMS Domains assessed: orientation, memory, attention, executive functions   SLUMS Interpretation Results of SlUMS,  functional cognitive assessment and pt's children's report of her current baselne performance in home setting all suggest severe level of dementia   Visual Perception   Impact of Vision Impairment on Function (Vision) Pt did not follow directives for accurate visual screening. Wearing glasses. During ambulatory tasks it appeared she ignored stimuli around her periphery which may suggest some tunneling vision (often reflective of worsening dementia) however not fully assessed to establish that conclusion.   Pain Assessment   Patient Currently in Pain No   Coordination   Gross Motor Coordination Impaired   Transfers   Transfers sit-stand transfer;toilet transfer   Sit-Stand Transfer   Sit-Stand [place] (Transfers) minimum assist (75% patient effort)   Toilet Transfer   Type " (Toilet Transfer) stand-sit;sit-stand   [place] (Toilet Transfer) minimum assist (75% patient effort)   Balance   Balance Comments Mod A of 1 w/A of another assisting with walker/IV pole   Activities of Daily Living   BADL Assessment/Intervention toileting   Toileting   [place] (Toileting) moderate assist (50% patient effort)   Clinical Impression   Criteria for Skilled Therapeutic Interventions Met (OT) Yes, treatment indicated   OT Diagnosis Decline in ADL/IADL performance and functional mobility   OT Problem List-Impairments impacting ADL problems related to;cognition;balance;inability to direct their own care;mobility;motor control   Assessment of Occupational Performance 5 or more Performance Deficits   Identified Performance Deficits functional mobility, dressing, toileting, grooming, bathing, self-feeding   Planned Therapy Interventions (OT) ADL retraining;cognition;transfer training   Clinical Decision Making Complexity (OT) moderate complexity   Risk & Benefits of therapy have been explained evaluation/treatment results reviewed;care plan/treatment goals reviewed;risks/benefits reviewed;current/potential barriers reviewed;participants voiced agreement with care plan;participants included;patient;daughter;son   OT Discharge Planning   OT Discharge Recommendation (DC Rec) Long term care facility  (Memory Care)   OT Rationale for DC Rec Patient presents with severe cognitive deficits affecting ability to perform any self-care tasks or mobilize safely on own. Will need full daily care and is unsafe to return to her independent living apartment.   OT Brief overview of current status Scored 1/30 on SLUMS, oriented to self only, unable to name or demonstrate correct use of 90% of common ADL tools. Requires A of 2 for amb and unable to use walker safely due to cognitive, visuospatial and motor execution impairments.   Total Evaluation Time (Minutes)   Total Evaluation Time (Minutes) 24   OT  Goals   Therapy Frequency (OT) Other (see comments)   OT Predicted Duration/Target Date for Goal Attainment   (Will trial two treatment sessions to start to see if any progress overnight with basic self-care skills)   OT Goals Hygiene/Grooming;Toilet Transfer/Toileting;Cognition;OT Goal 1   OT: Hygiene/Grooming supervision/stand-by assist;while standing  (set-up and cues to initiate, sequence, cessate)   OT: Toilet Transfer/Toileting Minimal assist;toilet transfer;cleaning and garment management;using adaptive equipment  (of 1 person)   OT: Cognitive Goal Met   OT: Goal 1 Patient will perform self-feeding with cueing and set-up

## 2022-07-28 NOTE — PLAN OF CARE
Goal Outcome Evaluation:    Plan of Care Reviewed With: patient      Alert and oriented to self, unable to answer questions or follow directions. Remains  A-fib rate controlled with Cardizem gtt, titrated to meet the HR and BP goal. IV fluid NS running @100ml/hr. K replaced  per RN managed orders. Voiding to the bathroom with king colored urine. Denies pain.

## 2022-07-28 NOTE — PLAN OF CARE
Goal Outcome Evaluation:    Plan of Care Reviewed With: daughter, son     Overall Patient Progress: no change    Outcome Evaluation: Patient has had minimal PO intake for several months with living alone & forgetting to eat. Family would drop food off for patient ~3x/week but when they would come back none of the food would have been touched. Obtained food preferences for patient from family - will send meals TID with food preferences for PO encouragement. Trial Ensure mixed with ice cream at dinner.

## 2022-07-28 NOTE — CONSULTS
CLINICAL NUTRITION SERVICES  -  ASSESSMENT NOTE      Recommendations Ordered by Registered Dietitian (RD):   Trial chocolate Ensure mixed with vanilla ice cream @ dinner meals   Not Appropriate for Room Service - have obtained food preferences from family at bedside and will send soft, finger type foods at meals TID   If patient does not drink Ensure supplement would recommend adding liquid MVI daily (unable to swallow large tablets per family)    Please provide PO encouragement and cueing at all meal times    Malnutrition:   % Weight Loss:  > 10% in 6 months (severe malnutrition)  % Intake:  </= 50% for >/= 1 month (severe malnutrition)  Subcutaneous Fat Loss:  Orbital region moderate depletion and Upper arm region severe depletion  Muscle Loss:  Temporal region moderate depletion, Clavicle bone region moderate depletion, Acromion bone region severe depletion, Dorsal hand region moderate-severe depletion, Patellar region severe depletion, Anterior thigh region severe depletion and Posterior calf region severe depletion  Fluid Retention:  None noted    Malnutrition Diagnosis: Severe malnutrition  In Context of:  Environmental or social circumstances       REASON FOR ASSESSMENT  Zahra Pulido is a 76 year old female seen by Registered Dietitian for Provider Order - suspect malnutrition      NUTRITION HISTORY  - Information obtained from chart review and patient's daughter and son-in-law at bedside this afternoon.  - Noted patient has had a progressive decline for the past ~6 months or so, likely dementia. Has been living alone in an apartment.   - Daughter and KELY note that patient hardly eats anything. They drop off food for her ~3x/week but when they go back none of the food they have dropped off had been eaten. Typically would drink a mountain dew and then wouldn't eat or drink anything for the remainder of the day.   - Likes soft, finger food items and yogurt. Does not like burgers, fish, milk or any  "vegetables. Needs lots of cueing and encouragement with eating.   - Negative for urine ketones on admission.   - No known food allergies noted.     CURRENT NUTRITION ORDERS  Diet Order:     Regular     Current Intake/Tolerance:  Had a few bites of scrambled eggs and english muffin for breakfast this morning.       NUTRITION FOCUSED PHYSICAL ASSESSMENT FOR DIAGNOSING MALNUTRITION)  Yes (visual as patient unable to participate in full exam - alert and oriented to self only)               Observed:    Muscle wasting (refer to documentation in Malnutrition section) and Subcutaneous fat loss (refer to documentation in Malnutrition section)    Obtained from Chart/Interdisciplinary Team:  Hx/o osteoporosis     ANTHROPOMETRICS  Height: 5' 6\"  Weight: 146 lbs 1.6 oz  Body mass index is 23.58 kg/m .  Weight Status:  Normal BMI  IBW: 59.1 kg (130 lb) +/- 10%  % IBW: 112%  Weight History: Son and daughter are unsure of UBW but feel that patient has lost 50-70 lb in the past ~6 months. Overall down 34# in the past 14 months (19% weight loss). Son and daughter think this has been within the past 6 months but no weight records on file to confirm during this timeframe.   Wt Readings from Last 10 Encounters:   07/28/22 66.3 kg (146 lb 1.6 oz)   05/12/21 81.9 kg (180 lb 8 oz)   02/11/20 82.1 kg (181 lb)   10/17/18 73.9 kg (163 lb)   09/17/18 73 kg (161 lb)   05/01/18 74.8 kg (165 lb)   12/10/17 76.7 kg (169 lb 3 oz)   07/11/17 80.6 kg (177 lb 9.6 oz)   05/07/17 81.2 kg (179 lb)   10/31/16 81.2 kg (179 lb)       LABS  Labs reviewed    MEDICATIONS  Medications reviewed  - vitamin K 5 mg/day  - thiamine high replacement protocol       ASSESSED NUTRITION NEEDS PER APPROVED PRACTICE GUIDELINES:    Dosing Weight 66.3 kg (standing weight on 7/28)  Estimated Energy Needs: 7636-1077 kcals (25-30 Kcal/Kg)  Justification: maintenance   Estimated Protein Needs: 65-80 grams protein (1-1.2 g pro/Kg)  Justification: preservation of lean body " mass  Estimated Fluid Needs: 9281-0095  mL (1 mL/Kcal)  Justification: maintenance or per provider pending fluid status       NUTRITION DIAGNOSIS:  Inadequate oral intake related to cognitive impairment, forgetting to eat as evidenced by patient consuming < 50% nutrition needs for the past 3+ months per nutrition hx obtained from patient's daughter and son in law       NUTRITION INTERVENTIONS  Recommendations / Nutrition Prescription  Trial chocolate Ensure mixed with vanilla ice cream @ dinner meals   Not Appropriate for Room Service - have obtained food preferences from family at bedside and will send soft, finger type foods at meals TID   If patient does not drink Ensure supplement would recommend adding liquid MVI daily (unable to swallow large tablets per family)    Please provide PO encouragement and cueing at all meal times       Implementation  Nutrition education: Provided education on RD and role of care to family at bedside. Discussed nutrition Hx and recommendations while in hospital. Have obtained food preferences. Reviewed ONS and indications for use.   Composition of Meals and Snacks - send 3 standard meal trays/day with food preferences  Medical Food Supplement - ordered as above       Nutrition Goals  Patient will consistently consume >/= 50% meals TID + 1 supplement daily       MONITORING AND EVALUATION:  Progress towards goals will be monitored and evaluated per protocol and Practice Guidelines      Wanda Guerrero, AMBER, LD

## 2022-07-28 NOTE — PROGRESS NOTES
[facility]    Hospitalist Progress Note    Date of Service (when I saw the patient): 07/28/2022  Admit date: 7/27/2022    Interval History   Full details of events over last 24 hours outlined below.   Patient unable to give history due to her dementia.  She does not know where she is or why she is here.  Per her son and daughter.  She has not been able to drive for over a year and a half, prior to moving her to assisted living.  She is taken it down helped her in the last 6 months.  They do not think she is drank any alcohol for the last couple months.  They are most concerned right now regarding where she can discharge to.  No evidence of SOB, CP, abdominal pain, N/V/D.  Per nursing she does not want to eat.  And refuses to participate in most cares.    Assessment & Plan    Zahra is a 76-year-old woman with history of cognitive impairment and alcohol use disorder who has required significant assistance from family and friends for activities of daily living.  Her friend recently found her on the floor mildly confused and unable to get up.  Therefore EMS was called.    At presentation she was in atrial fibrillation with rapid ventricular rate, heart rate 120, temperature 97.4, blood pressure 137/95, respiratory rate 16, oxygenation 95% on room air.  Labs significant for sodium of 132, potassium 2.2, BUN 17, CR 0.75.  Liver panel unremarkable, AST just minimally elevated at 47.  Glucose 152.  Lactic acid elevated at 2.8.  INR 2.13.  CBC: WBC 12.6 (normalized to 6.1 by HD #2) hemoglobin 15.3, platelets 282.  UA negative for infection with SG of 1.021.  Alcohol level on detectable.  She was rotated on her checks x-ray, no clear evidence of infiltrate or pulmonary edema.  CT scan negative.    Atrial fibrillation with RVR, new diagnosis  Severe hypokalemia (2 @ admission)   HTN not taking any of her previously prescribed medications, lisinopril and metoprolol  *TSH normal 0.64, magnesium 2  *  History of excessive alcohol use with out any known recent use in the last couple months.  BAL at admission was undetectable.   * No known history of heart disease      Currently on Cardizem drip    Replacing potassium per protocol, add potassium to IV fluids.     Resumed metoprolol to 25 mg twice daily. Increase metoprolol to 25 mg 3 times daily and attempt to wean off diltiazem drip.  Discussed with nursing..    Echocardiogram    [id number] 2 greater than 2 based on age, gender and hypertension. Hold on initiation of anticoagulation at this time given elevated INR.     Elevated INR, 2.13, not on anticoagulation.  Normal PTT and platelets.  No clear evidence of cirrhosis by liver.  I see no prior history of imaging of the abdomen.  She does have a history of excessive alcohol use.  This may also be nutritional    Vitamin K 5 mg p.o. daily, follow-up INR    Right upper quadrant ultrasound of the liver for 7/29/22     Cognitive impairment, likely dementia   *Head CT unremarkable, TSH normal.      Check thiamine and started on high-dose thiamine protocol given history of alcohol use and high risk for malnutrition    Appreciate neurology consultation.  EEG has been ordered.    PT/OT evaluation and care management consult for safe discharge plan.     Hyperlipidemia. No longer taking Crestor    Holding statin for now    GERD    Resume omeprazole which she was taking previously    Probable severe malnutrition due to history of poor p.o. intake and alcohol use.    Nutrition consulted    Diet: Orders Placed This Encounter      Combination Diet Regular Diet Adult     IVF: Added potassium and decreased rate of IV fluids.  Now on normal saline +20 of KCl at 75 cc an hour  Webster Catheter: Not present     DVT Prophylaxis: Pneumatic Compression Devices  Code Status: No CPR- Do NOT Intubate     Disposition: Expected discharge TBD.  Patient is obviously not safe to discharge home and family unable to care for her so anticipate  this will be a complicated disposition.  Communication: Discussed with son and daughter patient neurology and nurse on 07/28/22    Blank Navarro MD  Hospitalist Service  [facility]  Securely message with the Vocera Web Console (learn more here)  Text page via [employer] Paging/Directory    -Data reviewed today: I reviewed all new labs and imaging results over the last 24 hours. I personally reviewed no images or EKG's today.    Physical Exam   Temp: 97.2  F (36.2  C) Temp src: Axillary BP: 112/71 Pulse: 78   Resp: 18 SpO2: 96 % O2 Device: None (Room air)    Vitals:    07/27/22 1800 07/28/22 0529   Weight: 67.6 kg (149 lb) 66.3 kg (146 lb 1.6 oz)     Vital Signs with Ranges  Temp:  [97.2  F (36.2  C)-97.4  F (36.3  C)] 97.2  F (36.2  C)  Pulse:  [] 78  Resp:  [12-25] 18  BP: ()/() 112/71  SpO2:  [91 %-99 %] 96 %  I/O last 3 completed shifts:  In: 1357.67 [I.V.:1107.67; IV Piggyback:250]  Out: -     Today's Exam  Constitutional: NAD,   Neuropsyche:  Alert, not oriented to place or situation, unable to answer most questions appropriately.  Unable to follow commands consistently.  Respiratory: Breathing comfortably, decreased air exchange (unable to follow commands to take deep breaths), no wheezes, no crackles.   Cardiovascular: Irregular rate and rhythm, no edema.  GI: soft, NT/ND, BS normal  Skin/Integumen:  No acute rash or sign of bleeding.     Medications   All medications reviewed on 07/28/22     dilTIAZem HCl-Sodium Chloride 5 mg/hr (07/28/22 0730)     sodium chloride 100 mL/hr at 07/27/22 1938       metoprolol tartrate  25 mg Oral BID     pantoprazole  40 mg Oral QAM AC     phytonadione  5 mg Oral Daily     sodium chloride (PF)  3 mL Intracatheter Q8H     thiamine  500 mg Intravenous TID    Followed by     [START ON 7/30/2022] thiamine  250 mg Intravenous Daily    Followed by     [START ON 8/4/2022] thiamine  100 mg Oral Daily       Data   Recent Labs   Lab  07/28/22  0436 07/27/22  1833 07/27/22  1346   WBC 6.1  --  12.6*   HGB 12.1  --  15.3   MCV 86  --  84     --  282   INR  --   --  2.13*     --  132*   POTASSIUM 3.0*  3.0* 2.6* 2.2*   CHLORIDE 99  --  91*   CO2 27  --  30   BUN 14  --  17   CR 0.65  --  0.75   ANIONGAP 7  --  11   VIRGILIO 8.0*  --  8.9   *  --  152*   ALBUMIN 2.1*  --  2.7*   PROTTOTAL 5.5*  --  7.1   BILITOTAL 1.0  --  1.1   ALKPHOS 95  --  114   ALT 23  --  31   AST 37  --  47*       Recent Results (from the past 24 hour(s))   CT Head w/o Contrast    Narrative    CT SCAN OF THE HEAD WITHOUT CONTRAST  July 27, 2022 2:22 PM     HISTORY: Found on ground with altered mental status.    TECHNIQUE: Axial images of the head and coronal reformations without  IV contrast material. Radiation dose for this scan was reduced using  automated exposure control, adjustment of the mA and/or kV according  to patient size, or iterative reconstruction technique.    COMPARISON: None.    FINDINGS: Mild volume loss is present. White matter hypoattenuation  likely represents mild chronic small vessel ischemic change. The  cerebral hemispheres, brainstem, and cerebellum otherwise demonstrate  normal morphology and attenuation. No evidence of acute ischemia,  hemorrhage, mass, mass effect or hydrocephalus. The visualized  calvarium, tympanic cavities, mastoid cavities, and paranasal sinuses  are unremarkable. Nonspecific soft tissue filling the right external  auditory canal, presumably cerumen.      Impression    IMPRESSION: No acute intracranial abnormality.    ATIYA ARMSTRONG MD         SYSTEM ID:  [id number]   XR Chest Port 1 View    Narrative    XR CHEST PORT 1 VIEW   7/27/2022 2:29 PM     HISTORY: palpitations    COMPARISON: None available.      Impression    IMPRESSION: Patient is rotated to the right. Within this limitation,  cardiomediastinal silhouette is normal in size. There is a large  hiatal hernia. No focal airspace disease, pleural effusion  or  pneumothorax. No acute bony abnormality.    FAUZIA MIR MD         SYSTEM ID:  [id number]

## 2022-07-28 NOTE — PLAN OF CARE
Physical Therapy: Orders received. Chart reviewed and discussed with care team.? Physical Therapy not indicated due to pt's mobility needs being met by OT during hospital stay, will defer any PT intervention to next level of care.? Defer discharge recommendations to OT and medical team.? Will complete orders.
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
## 2022-07-28 NOTE — PLAN OF CARE
A&O x1. Pt denied pain. VSS, on RA. Diltiazem running at 5mg/hr. Potassium replaced, recheck in AM. Up w/ 2 assist + gait belt and walker, very unsteady. Tele: A Fib CVR. Alarms on. Plan for US of liver, EEG, and wean off diltiazem.

## 2022-07-28 NOTE — CONSULTS
Care Management Initial Consult    General Information  Assessment completed with: Family, Children, Servando Dang  Type of CM/SW Visit: Initial Assessment    Primary Care Provider verified and updated as needed: No   Readmission within the last 30 days: no previous admission in last 30 days      Reason for Consult: discharge planning, psychosocial concerns  Advance Care Planning: Advance Care Planning Reviewed: no concerns identified. Son reports patient does NOT have a HCD and Son and daughter (Servando and Laurence) are the only living children. They are Next of Kin.          Communication Assessment  Patient's communication style: spoken language (English or Bilingual)    Hearing Difficulty or Deaf: no   Wear Glasses or Blind: yes    Cognitive  Cognitive/Neuro/Behavioral: .WDL except, orientation  Level of Consciousness: confused  Arousal Level: opens eyes spontaneously  Orientation: disoriented to, time, place, situation        Speech: clear, illogical    Living Environment:   People in home: alone     Current living Arrangements: apartment      Able to return to prior arrangements: no  Living Arrangement Comments: no support at home, unable to complete ADL's IADL alone    Family/Social Support:  Care provided by: other (see comments) (Unable to care for herself)  Provides care for: no one, unable/limited ability to care for self  Marital Status:   Children          Description of Support System: Supportive, Involved    Support Assessment: Lacks necessary supervision and assistance, Lacks adequate physical care, Adequate family and caregiver support    Current Resources:   Patient receiving home care services: No     Community Resources: None  Equipment currently used at home: none  Supplies currently used at home: None    Employment/Financial:  Employment Status: retired        Financial Concerns: other (see comments) (Check for MA for housing (LTC) assistance)   Referral to Financial Worker: Yes  Finance  "Comments: Medicare    Lifestyle & Psychosocial Needs:  Social Determinants of Health     Tobacco Use: Medium Risk     Smoking Tobacco Use: Former Smoker     Smokeless Tobacco Use: Never Used   Alcohol Use: Not on file   Financial Resource Strain: Not on file   Food Insecurity: Not on file   Transportation Needs: Not on file   Physical Activity: Not on file   Stress: Not on file   Social Connections: Not on file   Intimate Partner Violence: Not on file   Depression: Not on file   Housing Stability: Not on file       Functional Status:  Prior to admission patient needed assistance:    Patient was living alone and unable to cook her food, bathe herself, go to the bathroom but was not using any assistive devices for mobility. Daughter assist once a week with bathing.           Mental Health Status:      No concerns identified     Chemical Dependency Status:      \"was drinking a lot of alcohol, mostly beer, but for the last 2 months she is not drinking any\". Patient's son reported when patient was drinking it was about a case of beer a week.          Values/Beliefs:  Spiritual, Cultural Beliefs, Amish Practices, Values that affect care: no               Additional Information:  Per care management consult for discharge planning and concerns for patient safety due to dementia and unable to care for self, chart was reviewed. Patient is a 76 year old female that was admitted on 7/27/22 for being found on the floor- anticipated discharge date is unknown. Per chart review- patient has a cognitive impairment likely dementia and is disoriented x 4. Writer met with son (Servando) at bedside. Servando stated that about 1.5 years ago- they moved patient into an apartment and patient was doing well until about 6 months ago. Servando stated that patient has had a decline within the last 6 months. In patient's apartment, she is on the second level and has access to an elevator. Servando explained that patient was drinking about a case of beer a " "week and now only drinks mountain Dew through out the week and states patient is not eating. Writer asked if patient is able to make her own meals and Servando stated \" she cant even use a microwave\". Writer asked if meals are prepared for her/brought to her and Servando did not say that they are, but rather they take her grocery shopping once a week and get yogurt, bananas and other foods like donut holes, and then by the next week, either a bite or two of food is gone and the rest is thrown away or moldy. Servando did state that when they take patient out for meals, it was typical of patient to only eat a few bites since her decline in the last 6 months. Servando also detailed a time where he went to patient's home and saw stool on toilet-paper on the counter-top rather then in the toilet. Servando stated that patient receives no in home services, has no [facility] services and lives alone. Servando reported he lives about 17 minutes form patient and patient's daughter is about 5 minutes, however they both work and are able to provide assist upon discharge Patient has a friend that will check in on patient and spend time, however, is not able to provide the assist/supervision patient needs. Srevando is hopeful for assistance in finding the next level of care for patient and stated \"she cannot go home\". Servando has concerns regarding patient's inability to care for self at home. During the conversation, Laurence (daughter) came to bedside to join conversation. Laurence expressed they need patient in a new level of care but explained that she needs [facility] assistance and prefers her to be placed in [facility]. Writer expressed that therapy will be assessing patient for discharge recommendations and son stated he does not feel that rehab may not benefit patient. Writer expressed that we could look into [facility] or LTC. Laurence stated that patient has a LTC insurance policy. Writer expressed that every LTC policy is different and may not kick in " until 90 days of privately paying. Writer asked that the family call the LTC policy company and determine what her terms are of the policy to help guide the discharge planning process. Laurence stated that patient only gets social security and does not own a home. Writer expressed she can have a financial counselor reach out to determine if patient may qualify for MA and help with the process, Writer expressed that they may not reach out until tomorrow. Laurence is point of contact for Financial Counselor. Writer expressed waiver services and needing a M[place]hoice assessment and MA- and that can take 6-8 weeks but would be willing to start the intake process, family agreeable as another plan. Writer sent message to Financial Counselor kelsea, and he stated Luis will work with family on MA as he is in a meeting. Writer called [facility] services and spoke to Georgie ([phone]) and started process for MNchoice assessment. Intake completed but scheduling for intake can be 8-10 weeks.     Writer completed [facility] report for self neglect-inability to care for self/lack of supervision. VA web report number: [id number]    KENNEDY Conklin, LGSW   Social Work   [facility]

## 2022-07-29 NOTE — PLAN OF CARE
[id number]: A&O to self only. VSS ex AFib CVR. DIlt gtt weaned off & one-time dose of Metoprolol was given around 1600. Liver US completed in evening. Dinner ordered and nursing staff assisted with feeding.

## 2022-07-29 NOTE — PLAN OF CARE
Oriented to self, denies pain or SOB. Tele remains SR with PACs, VSS on RA. Voided x1 today, dark and concentrated, small BM. Ax1-2 with walker and gb Up to chair most of day. Pt needs to be fed and given fluids PO throughout the day. Potassium replaced. Daughter at bedside, updated.

## 2022-07-29 NOTE — PROGRESS NOTES
Care Management Follow Up    Length of Stay (days): 2    Expected Discharge Date: 08/01/2022     Concerns to be Addressed:       Patient plan of care discussed at interdisciplinary rounds: Yes    Anticipated Discharge Disposition: Long Term Care     Anticipated Discharge Services: None  Anticipated Discharge DME: None    Patient/family educated on Medicare website which has current facility and service quality ratings: no  Education Provided on the Discharge Plan:    Patient/Family in Agreement with the Plan: yes    Referrals Placed by CM/SW: Financial Services, Community Resources  Private pay costs discussed: Not applicable    Additional Information:  Writer placed call to patient's daughter Laurence who was recommended from previous  to follow up on patient's LTC policy and the specifics of terms. Laurence reports she has not yet called and appreciative of the reminder and will reach out to writer after placing call.     Will continue to follow and assist.     KENNEDY Fontaine, SW    [facility]

## 2022-07-29 NOTE — PROGRESS NOTES
"[facility]    Hospitalist Progress Note    Date of Service (when I saw the patient): 07/29/2022  Admit date: 7/27/2022    Interval History   Full details of events over last 24 hours outlined below.   Patient offers no complaints but exhibits severe cognitive impairment. She is able to say she \"is fine.\" But is unable to tell me where she is, date or time. Converted to NSR  Per nursing she does not want to eat or drink.  Unable to participate in cares.     Assessment & Plan    Zahra is a 76-year-old woman with history of cognitive impairment and alcohol use disorder who has required significant assistance from family and friends for activities of daily living.  Her friend recently found her on the floor mildly confused and unable to get up.  Therefore EMS was called.    At presentation she was in atrial fibrillation with rapid ventricular rate, heart rate 120, temperature 97.4, blood pressure 137/95, respiratory rate 16, oxygenation 95% on room air.  Labs significant for sodium of 132, potassium 2.2, BUN 17, CR 0.75.  Liver panel unremarkable, AST just minimally elevated at 47.  Glucose 152.  Lactic acid elevated at 2.8.  INR 2.13.  CBC: WBC 12.6 (normalized to 6.1 by HD #2) hemoglobin 15.3, platelets 282.  UA negative for infection with SG of 1.021.  Alcohol level on detectable.  She was rotated on her checks x-ray, no clear evidence of infiltrate or pulmonary edema.  CT scan negative.    Atrial fibrillation with RVR, new diagnosis > converted ton NSR on 7/28/22   Severe hypokalemia (2 @ admission)   HTN not taking any of her previously prescribed medications, lisinopril and metoprolol  *TSH normal 0.64, magnesium 2  * History of excessive alcohol use with out any known recent use in the last couple months.  BAL at admission was undetectable.   * No known history of heart disease  * Echo 7/27: EF 55%, RV nl size and function, No valve disease.       Replacing potassium per protocol, " add potassium to IV fluids.     Now that she is in NSR, continue metoprolol at 25 mg BID     TUK1OH2-BBAw 2 and INR came down with vitamin K. > However, giving her severe cognitive impairment, question whether anticoagulation for stroke prevention would be in line with family goals.     Goals of Care - Given severe cognitive impairment, it would be most reasonable to take a more comfort care approach. I need to discuss with family goals of care. We are severely short-staffed today. I will try to reach out with family as soon as I can.     Elevated INR, 2.13, RESOLVING  likely secondary to poor nutrition  Normal PTT and platelets. Not on anticoagulation. No clear evidence of cirrhosis by liver.  I see no prior history of imaging of the abdomen.  She does have a history of excessive alcohol use.  This may also be nutritional  * RUQ shows fatty liver without changes of cirrhosis  * Vitamin K 5 mg p.o. daily, with improvement in INR, likely nutritional    Recent Labs   Lab 07/29/22  0606 07/27/22  1346   INR 1.30* 2.13*       Severe cognitive impairment, likely dementia (Scored 1/30 on SLUMS on 7/28/22) most likely secondary to alcohol and or Alzheimer's dementia. Multi-infarct dementia is a possibility as well in context of afib. He has been progressed over the last couple of years.  *Head CT unremarkable, TSH normal.  * EEG on 7/27 generalized continuous slowing. No seizure activity.       Check thiamine and started on high-dose thiamine protocol given history of alcohol use and high risk for malnutrition    Check NH3 level.     PT/OT evaluation and care management consult for safe discharge plan. > will need 24 hour care    Need to have goals of care discussion    Hyperlipidemia. No longer taking Crestor    Holding statin for now    GERD    Resume omeprazole which she was taking previously    Probable severe malnutrition due to history of poor p.o. intake and alcohol use.  Very poor PO intake - BP, pulse stable.      Nutrition consulted    Check BMP to assess hydration status.     Diet: Orders Placed This Encounter      Combination Diet Regular Diet Adult     IVF: None, checking BMP.   Webster Catheter: Not present     DVT Prophylaxis: Pneumatic Compression Devices  Code Status: No CPR- Do NOT Intubate     Disposition: Expected discharge TBD.  Patient is obviously not safe to discharge home and family unable to care for her so anticipate this will be a complicated disposition. I need to have goals of care discussion with family.   Communication: Discussed with son and daughter patient neurology and nurse on 07/28/22, discussed with RN on 07/29/22     Blank Navarro MD  Hospitalist Service  [facility]  Securely message with the Vocera Web Console (learn more here)  Text page via [employer] Paging/Directory    -Data reviewed today: I reviewed all new labs and imaging results over the last 24 hours. I personally reviewed no images or EKG's today.    Physical Exam   Temp: 98.3  F (36.8  C) Temp src: Oral BP: 108/67 Pulse: 77   Resp: 18 SpO2: 93 % O2 Device: None (Room air)    Vitals:    07/27/22 1800 07/28/22 0529 07/29/22 0520   Weight: 67.6 kg (149 lb) 66.3 kg (146 lb 1.6 oz) 66.5 kg (146 lb 9.6 oz)     Vital Signs with Ranges  Temp:  [97  F (36.1  C)-98.3  F (36.8  C)] 98.3  F (36.8  C)  Pulse:  [66-99] 77  Resp:  [18] 18  BP: (106-132)/(67-90) 108/67  SpO2:  [93 %-96 %] 93 %  I/O last 3 completed shifts:  In: -   Out: 375 [Urine:375]    Today's Exam  Constitutional: NAD,   Neuropsyche:  Alert, not oriented to place or situation, unable to answer most questions appropriately.  Unable to follow commands consistently.  Respiratory: Breathing comfortably, decreased air exchange (unable to follow commands to take deep breaths), no wheezes, no crackles.   Cardiovascular: Irregular rate and rhythm, no edema.  GI: soft, NT/ND, BS normal  Skin/Integumen:  No acute rash or sign of bleeding.     Medications   All  medications reviewed on 22    dilTIAZem HCl-Sodium Chloride Stopped (22 1615)       metoprolol tartrate  25 mg Oral TID     pantoprazole  40 mg Oral QAM AC     phytonadione  5 mg Oral Daily     sodium chloride (PF)  3 mL Intracatheter Q8H     thiamine  500 mg Intravenous TID    Followed by     [START ON 2022] thiamine  250 mg Intravenous Daily    Followed by     [START ON 2022] thiamine  100 mg Oral Daily       Data   Recent Labs   Lab 22  0606 22  1235 22  0436 22  1833 22  1346   WBC  --   --  6.1  --  12.6*   HGB  --   --  12.1  --  15.3   MCV  --   --  86  --  84   PLT  --   --  249  --  282   INR 1.30*  --   --   --  2.13*   NA  --   --  133  --  132*   POTASSIUM  --  3.5 3.0*  3.0* 2.6* 2.2*   CHLORIDE  --   --  99  --  91*   CO2  --   --  27  --  30   BUN  --   --  14  --  17   CR  --   --  0.65  --  0.75   ANIONGAP  --   --  7  --  11   VIRGILIO  --   --  8.0*  --  8.9   GLC  --   --  128*  --  152*   ALBUMIN  --   --  2.1*  --  2.7*   PROTTOTAL  --   --  5.5*  --  7.1   BILITOTAL  --   --  1.0  --  1.1   ALKPHOS  --   --  95  --  114   ALT  --   --  23  --  31   AST  --   --  37  --  47*       Recent Results (from the past 24 hour(s))   Echocardiogram Complete   Result Value    LVEF  55%    Narrative    [id number]  [id number]  [id number]  [id number]^SANG^SANDRA^MILI     [facility]  Echocardiography Laboratory  [address] [place] [address]     Name: TRUPTI NASH  MRN: [id number]  : 1945  Study Date: 2022 09:31 AM  Age: 76 yrs  Gender: Female  Patient Location: [facility]  Reason For Study: Atrial Fibrillation  Ordering Physician: SANDRA DOMÍNGUEZ  Performed By: Blade Kaiser     BSA: 1.7 m2  Height: 66 in  Weight: 146 lb  HR: 79  BP: 125/94 mmHg  ______________________________________________________________________________  Procedure  Complete Portable Echo Adult. [employer] (NDC #[id number]) given  intravenously.  ______________________________________________________________________________  Interpretation Summary     1. The left ventricle is normal in structure, function and size. The visual  ejection fraction is estimated at 55%.  2. The right ventricle is normal in structure, function and size.  3. No valve disease.     No previous echo for comparison.  ______________________________________________________________________________  Left Ventricle  The left ventricle is normal in structure, function and size. There is normal  left ventricular wall thickness. The visual ejection fraction is estimated at  55%. Left ventricular diastolic function is indeterminate. Normal left  ventricular wall motion.     Right Ventricle  The right ventricle is normal in structure, function and size.     Atria  The left atrium is moderate to severely dilated. The right atrium is mildly  dilated. There is no atrial shunt seen.     Mitral Valve  The mitral valve leaflets appear thickened, but open well. There is trace  mitral regurgitation.     Tricuspid Valve  There is trace to mild tricuspid regurgitation. The right ventricular systolic  pressure is approximated at 7.4 mmHg plus the right atrial pressure.     Aortic Valve  There is mild trileaflet aortic sclerosis.     Pulmonic Valve  The pulmonic valve is normal in structure and function.     Vessels  The aortic root is not well visualized. The inferior vena cava was normal in  size with preserved respiratory variability.     Pericardium  There is no pericardial effusion.     Rhythm  Rhythm likely sinus with PAC's.  ______________________________________________________________________________  MMode/2D Measurements & Calculations     IVSd: 0.91 cm  LVIDd: 3.6 cm  LVIDs: 2.9 cm  LVPWd: 0.97 cm  FS: 20.0 %  LV mass(C)d: 100.7 grams  LV mass(C)dI: 57.6 grams/m2  Ao root diam: 3.1 cm  LVOT diam: 1.8 cm  LVOT area: 2.5 cm2  LA Volume (BP): 94.9 ml  LA Volume Index (BP): 54.2  ml/m2  RWT: 0.54     Doppler Measurements & Calculations  MV E max juan: 108.0 cm/sec  MV A max juan: 105.7 cm/sec  MV E/A: 1.0  MV max P.1 mmHg  MV mean P.9 mmHg  MV V2 VTI: 32.8 cm  MVA(VTI): 1.7 cm2  MV dec slope: 507.4 cm/sec2  Ao V2 max: 158.0 cm/sec  Ao max PG: 10.0 mmHg  Ao V2 mean: 115.7 cm/sec  Ao mean P.0 mmHg  Ao V2 VTI: 38.7 cm  SAGAR(I,D): 1.4 cm2  SAGAR(V,D): 1.4 cm2  LV V1 max PG: 3.4 mmHg  LV V1 max: 91.7 cm/sec  LV V1 VTI: 22.6 cm  SV(LVOT): 56.1 ml  SI(LVOT): 32.0 ml/m2  TR max juan: 135.7 cm/sec  TR max P.4 mmHg  AV Juan Ratio (DI): 0.58  SAGAR Index (cm2/m2): 0.83  E/E' av.6  Lateral E/e': 14.6  Medial E/e': 14.6     ______________________________________________________________________________  Report approved by: Danuta Marina 2022 11:09 AM         US Abdomen Limited    Narrative    EXAM: US ABDOMEN LIMITED  LOCATION: [facility]  DATE/TIME: 2022 5:13 PM    INDICATION: elevated INR, history of drinking evaluate for cirrhosis.  COMPARISON: None.  TECHNIQUE: Limited abdominal ultrasound.    FINDINGS:    GALLBLADDER: Normal. No gallstones, wall thickening, or pericholecystic fluid. Negative sonographic Nino's sign.    BILE DUCTS: No biliary dilatation. The common duct measures 2 mm.    LIVER: Diffusely increased echogenicity of hepatic parenchyma with smooth contour, although exam is technically difficult as patient is unable to hold their breath. No focal mass.    RIGHT KIDNEY: No hydronephrosis.    PANCREAS: The pancreas is largely obscured by overlying gas.    No ascites.      Impression    IMPRESSION:  1.  Likely hepatic steatosis without definite morphologic changes of cirrhosis.  2.  No ascites.

## 2022-07-29 NOTE — PLAN OF CARE
Goal Outcome Evaluation:    Plan of Care Reviewed With: patient     Overall Patient Progress: no change     Remains confused, able to follow commands. Seems dehydrated, poor skin turgor, dry mucosa and low urine out put. Tried to feed the patient but does not open her mouth. Bladder scan 118ml. Vitals stable, Tele SR with PACs.

## 2022-07-29 NOTE — PROGRESS NOTES
"Care Management Follow Up    Length of Stay (days): 2  Expected Discharge Date: 08/01/2022  Concerns to be Addressed:     Vulnerable adult - placement needed (cannot return home)  Patient plan of care discussed at interdisciplinary rounds: Yes  Anticipated Discharge Disposition: Long Term Care   Anticipated Discharge Services: None  Anticipated Discharge DME: None  Patient/family educated on Medicare website which has current facility and service quality ratings: no  Education Provided on the Discharge Plan:  yes  Patient/Family in Agreement with the Plan: yes  Referrals Placed by CM/SW: Financial Services, Community Resources  Private pay costs discussed: TBD - family checking in to pt's LTC insurance policy and financial counselor is helping with application for MA     Additional Information:  Received call from Adult Protection Investigator with [employer] [facility] ([phone]).  Provided information from pt chart per his request, including SLUMS score 1/30.   For further followup, he would like Zoey NEGRON to \"please contact for further coordination when you have a chance, want to make sure pt is not sent home to apartment in [place]\"       Rosibel Quinteros RN, BSN, PHN  [facility]  Inpatient Care Management - [facility]  [facility] CM RN Mobile: [phone] daily 7:30-4:00          "

## 2022-07-30 NOTE — PROGRESS NOTES
"[facility]    Hospitalist Progress Note    Date of Service (when I saw the patient): 07/30/2022  Admit date: 7/27/2022    Interval History   Full details of events over last 24 hours outlined below.   Patient offers no complaints but exhibits severe cognitive impairment. She is able to say she \"is fine.\" But is unable to tell me where she is, date or time. Converted to NSR  Per nursing she does not want to eat or drink.  Unable to participate in cares.     Assessment & Plan    Zahra is a 76-year-old woman with history of cognitive impairment and alcohol use disorder who has required significant assistance from family and friends for activities of daily living.  Her friend recently found her on the floor mildly confused and unable to get up.  Therefore EMS was called.    At presentation she was in atrial fibrillation with rapid ventricular rate, heart rate 120, temperature 97.4, blood pressure 137/95, respiratory rate 16, oxygenation 95% on room air.  Labs significant for sodium of 132, potassium 2.2, BUN 17, CR 0.75.  Liver panel unremarkable, AST just minimally elevated at 47.  Glucose 152.  Lactic acid elevated at 2.8.  INR 2.13.  CBC: WBC 12.6 (normalized to 6.1 by HD #2) hemoglobin 15.3, platelets 282.  UA negative for infection with SG of 1.021.  Alcohol level on detectable.  She was rotated on her checks x-ray, no clear evidence of infiltrate or pulmonary edema.  CT scan negative.    Atrial fibrillation with RVR, new diagnosis > converted ton NSR on 7/28/22   Severe hypokalemia (2 @ admission)   HTN not taking any of her previously prescribed medications, lisinopril and metoprolol  *TSH normal 0.64, magnesium 2  * History of excessive alcohol use with out any known recent use in the last couple months.  BAL at admission was undetectable.   * No known history of heart disease  * Echo 7/27: EF 55%, RV nl size and function, No valve disease.       Replacing potassium per protocol, " add potassium to IV fluids.     Now that she is in NSR, continue metoprolol at 25 mg BID     [id number] 2 and INR came down with vitamin K. > However, giving her severe cognitive impairment, question whether anticoagulation for stroke prevention would be in line with family goals.     Goals of Care - Given severe cognitive impairment.  Called and spoke to Stefania Dang's daughter.  Given her advanced dementia and inability to really participate in cares, she was in agreement that she would best be served by focusing on comfort.  Therefore I have transitioned her over to comfort cares on 7/30.  I have ordered no comfort care meds as she exhibits no evidence of discomfort at this time hospice consult placed.     Elevated INR, 2.13, RESOLVING  likely secondary to poor nutrition  Normal PTT and platelets. Not on anticoagulation. No clear evidence of cirrhosis by liver.  I see no prior history of imaging of the abdomen.  She does have a history of excessive alcohol use.  This may also be nutritional  * RUQ shows fatty liver without changes of cirrhosis  * Vitamin K 5 mg p.o. daily, with improvement in INR, likely nutritional    Recent Labs   Lab 07/29/22  0606 07/27/22  1346   INR 1.30* 2.13*       Advanced dementia, (Scored 1/30 on SLUMS on 7/28/22) most likely secondary to alcohol and or Alzheimer's dementia. Multi-infarct dementia is a possibility as well in context of afib. He has been progressed over the last couple of years.  *Head CT unremarkable, TSH normal, B12 643, Ammonia normal.   * EEG on 7/27 generalized continuous slowing. No seizure activity.       Thiamine level pending.     Continue high-dose thiamine protocol given history of alcohol use and high risk for malnutrition until completes course.    PT/OT evaluation and care management consult for safe discharge plan. > will need 24 hour care    Need to have goals of care discussion    Hyperlipidemia. No longer taking Crestor    Holding statin for  now    GERD    Resume omeprazole which she was taking previously    Probable severe malnutrition due to history of poor p.o. intake and alcohol use.  Very poor PO intake - BP, pulse stable.     Nutrition consulted    Diet: Orders Placed This Encounter      Combination Diet Regular Diet Adult     IVF: None, checking BMP.   Webster Catheter: Not present     DVT Prophylaxis: Pneumatic Compression Devices  Code Status: No CPR- Do NOT Intubate     Disposition: To SNF on hospice once placement found.   Communication: Discussed with daughter and RN on 07/30/22    Blank Navarro MD  Hospitalist Service  [facility]  Securely message with the Vocera Web Console (learn more here)  Text page via [employer] Paging/Directory    -Data reviewed today: I reviewed all new labs and imaging results over the last 24 hours. I personally reviewed no images or EKG's today.    Physical Exam   Temp: 97.9  F (36.6  C) Temp src: Oral BP: (!) 144/90 Pulse: 81   Resp: 16 SpO2: 96 % O2 Device: None (Room air)    Vitals:    07/28/22 0529 07/29/22 0520 07/30/22 0433   Weight: 66.3 kg (146 lb 1.6 oz) 66.5 kg (146 lb 9.6 oz) 69.2 kg (152 lb 8 oz)     Vital Signs with Ranges  Temp:  [97.7  F (36.5  C)-97.9  F (36.6  C)] 97.9  F (36.6  C)  Pulse:  [71-87] 81  Resp:  [16] 16  BP: (119-144)/(78-93) 144/90  SpO2:  [93 %-98 %] 96 %  I/O last 3 completed shifts:  In: 1361 [P.O.:1125; I.V.:236]  Out: 500 [Urine:500]    Today's Exam  Constitutional: NAD,   Neuropsyche:  Alert, not oriented to place or situation, unable to answer most questions appropriately.  Unable to follow commands.  Respiratory: Breathing comfortably, decreased air exchange (unable to follow commands to take deep breaths), no wheezes, no crackles.   Cardiovascular: Irregular rate and rhythm, no edema.  GI: soft, NT/ND, BS normal  Skin/Integumen:  No acute rash or sign of bleeding.     Medications   All medications reviewed on 07/30/22      metoprolol tartrate  25 mg  Oral BID     pantoprazole  40 mg Oral QAM AC     phytonadione  5 mg Oral Daily     sodium chloride (PF)  3 mL Intracatheter Q8H     thiamine  250 mg Intravenous Daily    Followed by     [START ON 8/4/2022] thiamine  100 mg Oral Daily       Data   Recent Labs   Lab 07/30/22  0545 07/29/22  1755 07/29/22  1100 07/29/22  0606 07/28/22  1235 07/28/22  0436 07/27/22  1833 07/27/22  1346   WBC 7.1  --   --   --   --  6.1  --  12.6*   HGB 11.2*  --   --   --   --  12.1  --  15.3   MCV 89  --   --   --   --  86  --  84     --   --   --   --  249  --  282   INR  --   --   --  1.30*  --   --   --  2.13*     --  138  --   --  133  --  132*   POTASSIUM 3.7 3.9 3.1*  --    < > 3.0*  3.0*   < > 2.2*   CHLORIDE 105  --  105  --   --  99  --  91*   CO2 24  --  27  --   --  27  --  30   BUN 11  --  12  --   --  14  --  17   CR 0.62  --  0.59  --   --  0.65  --  0.75   ANIONGAP 6  --  6  --   --  7  --  11   VIRGILIO 8.0*  --  8.2*  --   --  8.0*  --  8.9   GLC 85  --  97  --   --  128*  --  152*   ALBUMIN  --   --   --   --   --  2.1*  --  2.7*   PROTTOTAL  --   --   --   --   --  5.5*  --  7.1   BILITOTAL  --   --   --   --   --  1.0  --  1.1   ALKPHOS  --   --   --   --   --  95  --  114   ALT  --   --   --   --   --  23  --  31   AST  --   --   --   --   --  37  --  47*    < > = values in this interval not displayed.       No results found for this or any previous visit (from the past 24 hour(s)).

## 2022-07-30 NOTE — PLAN OF CARE
Goal Outcome Evaluation:    Plan of Care Reviewed With: patient     Overall Patient Progress: improving    VSS, tele SR, alter to self only.  Difficult doing full neuro assessment as patient is not always compliant with answering questions or performing tasks.  Previous RN had same issue, MD states ok to discontinue neuro checks.  Up with assist of one, gait belt, and walker, denies pain.  Had small incontinence this am, encouraged fluids, encouraged/assisted with meals.  Repositioned frequently, cont to monitor.

## 2022-07-30 NOTE — PROGRESS NOTES
Received pt from cardiac unit.  Pt settled into bed.  Alarm on.  Water provided.  TV on per pt request.  Verified DNR/DNI status on ID band.  Plan for comfort cares.

## 2022-07-30 NOTE — PROGRESS NOTES
Shift unremarkable.  VSS on room air.  Writer continues to encourage fluid this shift, dietary following.  Paroxysmal afib/NSR throughout the night with PACs, rates controlled.  Consistent with history, team aware.  Pt remains oriented limitedly only to self, unable to provide year of birth on first assessment, later, not answer writers questions.  Unsure of pt's accuracy with answers to writer's assessment questions as she consistently states no when asked yes or no assessment question answers.  Neuro checks as pt participates remain stable with intermittent areas of inability to assess.     Pt continues with female external catheter and no void this shift.  Bladder scan shows 154mL in bladder at 0411. q2h repositioning completed, pulsate mattress ordered.  Preventative foam with border applied to coccyx this shift to reduce risk of friction/shear injuries, no current redness noted.  Potassium level this AM stable.

## 2022-07-31 NOTE — PROGRESS NOTES
Care Management Follow Up    Length of Stay (days): 4    Expected Discharge Date: 07/31/2022     Concerns to be Addressed:       Patient plan of care discussed at interdisciplinary rounds: Yes    Anticipated Discharge Disposition: Long Term Care     Anticipated Discharge Services: None  Anticipated Discharge DME: None    Patient/family educated on Medicare website which has current facility and service quality ratings: no  Education Provided on the Discharge Plan:    Patient/Family in Agreement with the Plan: yes    Referrals Placed by CM/SW: Financial Services, Community Resources  Private pay costs discussed: Not applicable    Additional Information:  Writer received a call from Dr. Navarro (hospitalist) stating that she talked to patient's family and isn't sure if patient will qualify for hospice since patient is now eating and sitting up on her own. She said she's going to put in a palliative consult to see if patient qualifies for hospice, but thinks we should pursue LTC for now.    Will continue to follow.    MANOJ Rodriguez

## 2022-07-31 NOTE — PLAN OF CARE
[id number]:  Full assessment deferred d/t comfort cares. Alert to self only. K replaced per protocol, recheck tomorrow am. Out of bed impulsively x1 this shift, assisted back to bed w/ 1A. Tolerating thin liquids w/ fair appetite. No complaint or observation of pain.

## 2022-07-31 NOTE — PLAN OF CARE
Comfort cares, full assessment deferred. Alert & oriented to self only. No signs or complaints of pain. Regular diet, fair appetite. Not OOB this shift, T/R Q2H. Incont B/B, purewick in place, little UOP. Mepi to coccyx. Clarified medications w/ hospitalist. If comfort care medications are needed, page for them. Palliative consult placed to reassess plan moving forward. Discharge plan likely to LTC on hospice.

## 2022-07-31 NOTE — PROGRESS NOTES
Hospitalist paged regarding Mag/ K protocol active orders. K 3.4 this am, do you want this replaced or discontinued d/t comfort cares? Ty     Order to replace, as pt is currently tolerating PO. Recheck in am. Provider to decide further tomorrow am.

## 2022-07-31 NOTE — PLAN OF CARE
Goal Outcome Evaluation:      Comfort cares, full assessment deferred. Alert, oriented to self only. Not OOB this shift, turn/repo q2h. No signs of pain. Inc B/B, purewick in place, poor urine output. Mepilex to coccyx. Hospice consult placed. Plan to discharge to LTC on hospice.                    No retinal detachment or retinal tear noted.

## 2022-07-31 NOTE — PROGRESS NOTES
[facility]    Hospitalist Progress Note    Date of Service (when I saw the patient): 07/31/2022  Admit date: 7/27/2022    Interval History   Full details of events over last 24 hours outlined below.   Patient actually eating today and more bright and interactive.   Per RN she has been comfortable, cooperative, and is now eating and drinking. More interactive today.     Assessment & Plan    Zahra is a 76-year-old woman with history of cognitive impairment and alcohol use disorder who has required significant assistance from family and friends for activities of daily living.  Her friend recently found her on the floor mildly confused and unable to get up.  Therefore EMS was called.    At presentation she was in atrial fibrillation with rapid ventricular rate, heart rate 120, temperature 97.4, blood pressure 137/95, respiratory rate 16, oxygenation 95% on room air.  Labs significant for sodium of 132, potassium 2.2, BUN 17, CR 0.75.  Liver panel unremarkable, AST just minimally elevated at 47.  Glucose 152.  Lactic acid elevated at 2.8.  INR 2.13.  CBC: WBC 12.6 (normalized to 6.1 by HD #2) hemoglobin 15.3, platelets 282.  UA negative for infection with SG of 1.021.  Alcohol level on detectable.  She was rotated on her checks x-ray, no clear evidence of infiltrate or pulmonary edema.  CT scan negative.    Goals of Care - Given severe cognitive impairment.  Called and spoke to Stefania Dang's daughter.  Given her advanced dementia and inability to really participate in cares, she was in agreement that she would best be served by focusing on comfort.  Therefore I have transitioned her over to comfort cares on 7/30.  I have ordered no comfort care meds as she exhibits no evidence of discomfort at this time hospice consult placed.     On 07/31/22, discussed with daughter Laurence. Now that patient is eating and drinking and interactive, I suspect she may not qualify for hospice. Daughter does  not want further hospitalizations, medical investigations or hospitalizations, but would like to keep her comfortable and consider outpatient mgt of minor illness if her mother tolerates and it makes her more comfortable.     Therefore, palliative care will be consulted to outline goals and fill out POLST. I discussed with SW who will look into LTC with palliative goals.     Advanced dementia, (Scored 1/30 on SLUMS on 7/28/22) most likely secondary to alcohol and or Alzheimer's dementia. Multi-infarct dementia is a possibility as well in context of afib. He has been progressed over the last couple of years.  *Head CT unremarkable, TSH normal, B12 643, Ammonia normal.   * EEG on 7/27 generalized continuous slowing. No seizure activity.       Thiamine level pending on 07/31/22    Continue high-dose thiamine protocol given history of alcohol use and high risk for malnutrition until completes course.    As above, plan is for LTC with palliative goals.     Atrial fibrillation with RVR, new diagnosis > converted ton NSR on 7/28/22   Severe hypokalemia (2 @ admission), resolved  HTN not taking any of her previously prescribed medications, lisinopril and metoprolol  *TSH normal 0.64, magnesium 2  * History of excessive alcohol use with out any known recent use in the last couple months.  BAL at admission was undetectable.   * No known history of heart disease  * Echo 7/27: EF 55%, RV nl size and function, No valve disease.       Replacing potassium per protocol, add potassium to IV fluids.     Now that she is in NSR, continue metoprolol at 25 mg BID     [id number] 2 and INR came down with vitamin K. > However, giving her severe cognitive impairment, risk of anticoagulation outweighs benefit.     Recent Labs   Lab 07/31/22  0625 07/30/22  1029 07/30/22  0545 07/29/22  1755 07/29/22  1100 07/28/22  1235 07/28/22  0436 07/27/22  1833 07/27/22  1346   POTASSIUM 3.4  --  3.7 3.9 3.1* 3.5 3.0*  3.0*   < > 2.2*   MAG 2.0 2.0   --   --   --   --  2.4*  --  2.0    < > = values in this interval not displayed.     Elevated INR, 2.13, RESOLVING  secondary to poor nutrition  Normal PTT and platelets. Not on anticoagulation. No clear evidence of cirrhosis by liver.  I see no prior history of imaging of the abdomen.  She does have a history of excessive alcohol use.  This may also be nutritional  * RUQ shows fatty liver without changes of cirrhosis  * Vitamin K 5 mg p.o. lindsey x 3y, with improvement in INR, likely nutritional    Recent Labs   Lab 07/29/22  0606 07/27/22  1346   INR 1.30* 2.13*         Hyperlipidemia. No longer taking Crestor    Holding statin    GERD    Resume omeprazole which she was taking previously    Probable severe malnutrition due to history of poor p.o. intake and alcohol use.  Very poor PO intake - BP, pulse stable.     Nutrition consulted    Diet: Orders Placed This Encounter      Combination Diet Regular Diet Adult     IVF: None, checking BMP.   Webster Catheter: Not present     DVT Prophylaxis: Pneumatic Compression Devices  Code Status: No CPR- Do NOT Intubate     Disposition: To SNF on hospice once placement found.   Communication: Discussed with daughter and RN on 07/31/22    Blank Navarro MD  Hospitalist Service  [facility]  Securely message with the Vocera Web Console (learn more here)  Text page via [employer] Paging/Directory    -Data reviewed today: I reviewed all new labs and imaging results over the last 24 hours. I personally reviewed no images or EKG's today.    Physical Exam   Temp: 98  F (36.7  C) Temp src: Oral BP: 128/86 Pulse: 64   Resp: 16 SpO2: 98 % O2 Device: None (Room air)    Vitals:    07/29/22 0520 07/30/22 0433 07/31/22 0623   Weight: 66.5 kg (146 lb 9.6 oz) 69.2 kg (152 lb 8 oz) 68 kg (149 lb 14.6 oz)     Vital Signs with Ranges  Temp:  [97.9  F (36.6  C)-98  F (36.7  C)] 98  F (36.7  C)  Pulse:  [64-85] 64  Resp:  [16] 16  BP: (128-134)/(75-86) 128/86  SpO2:  [96 %-98 %]  98 %  I/O last 3 completed shifts:  In: 545 [P.O.:545]  Out: 500 [Urine:500]    Today's Exam  Constitutional: NAD,   Neuropsyche:  Alert, not oriented to place or situation, unable to answer most questions appropriately.  Unable to follow commands.  Respiratory: Breathing comfortably, decreased air exchange (unable to follow command to take deep breath), no wheezes, no crackles.   Cardiovascular: Irregular rate and rhythm, no edema.  GI: soft, NT/ND, BS normal  Skin/Integumen:  No acute rash or sign of bleeding.     Medications   All medications reviewed on 07/31/22      metoprolol tartrate  25 mg Oral BID     pantoprazole  40 mg Oral QAM AC     phytonadione  5 mg Oral Daily     sodium chloride (PF)  3 mL Intracatheter Q8H     thiamine  250 mg Intravenous Daily    Followed by     [START ON 8/4/2022] thiamine  100 mg Oral Daily       Data   Recent Labs   Lab 07/31/22  0625 07/30/22  0545 07/29/22  1755 07/29/22  1100 07/29/22  0606 07/28/22  1235 07/28/22  0436 07/27/22  1833 07/27/22  1346   WBC  --  7.1  --   --   --   --  6.1  --  12.6*   HGB  --  11.2*  --   --   --   --  12.1  --  15.3   MCV  --  89  --   --   --   --  86  --  84   PLT  --  172  --   --   --   --  249  --  282   INR  --   --   --   --  1.30*  --   --   --  2.13*   NA  --  135  --  138  --   --  133  --  132*   POTASSIUM 3.4 3.7 3.9 3.1*  --    < > 3.0*  3.0*   < > 2.2*   CHLORIDE  --  105  --  105  --   --  99  --  91*   CO2  --  24  --  27  --   --  27  --  30   BUN  --  11  --  12  --   --  14  --  17   CR  --  0.62  --  0.59  --   --  0.65  --  0.75   ANIONGAP  --  6  --  6  --   --  7  --  11   VIRGILIO  --  8.0*  --  8.2*  --   --  8.0*  --  8.9   GLC  --  85  --  97  --   --  128*  --  152*   ALBUMIN  --   --   --   --   --   --  2.1*  --  2.7*   PROTTOTAL  --   --   --   --   --   --  5.5*  --  7.1   BILITOTAL  --   --   --   --   --   --  1.0  --  1.1   ALKPHOS  --   --   --   --   --   --  95  --  114   ALT  --   --   --   --   --   --  23   --  31   AST  --   --   --   --   --   --  37  --  47*    < > = values in this interval not displayed.       No results found for this or any previous visit (from the past 24 hour(s)).

## 2022-07-31 NOTE — PROGRESS NOTES
Care Management Follow Up    Length of Stay (days): 4    Expected Discharge Date: 08/01/2022     Concerns to be Addressed:       Patient plan of care discussed at interdisciplinary rounds: Yes    Anticipated Discharge Disposition: Long Term Care     Anticipated Discharge Services: None  Anticipated Discharge DME: None    Patient/family educated on Medicare website which has current facility and service quality ratings: no  Education Provided on the Discharge Plan:    Patient/Family in Agreement with the Plan: yes    Referrals Placed by CM/SW: Financial Services, Community Resources  Private pay costs discussed: Not applicable    Additional Information:  Writer talked to Laurence (dtr) and Servando (son) in patient's room about hospice plans. Writer talked to Laurence and Servando about hospice, hospice in LTC, and hospice at [facility]. Laurence said that she has to call Luis (financial counselor) back to discuss the MA roverto. She said she's working on filling this out with Luis and will call tomorrow. Writer explained that once she has the roverto submitted, we can try to send referrals out for MA pending LTC facilities. They said that they would prefer a LTC facility in [facility] near [facility]. Told them we can try to send referrals near that area, but we may need to expand if we can't find a LTC facility in that are that has availability. Explained that once we find a facility, we'll need to find a hospice agency. Writer explained that writer could see if [facility] has a bed and they said that would be fine too.     Writer sent referral via [facility] to [facility]. Writer will check their availability tomorrow. Writer will follow up with Laurence CherryDtr) tomorrow to see if she has submitted the MA roverto.     Will continue to follow.    MANOJ Rodriguez

## 2022-08-01 NOTE — PROGRESS NOTES
[facility]    Hospitalist Progress Note    Date of Service (when I saw the patient): 08/01/2022  Admit date: 7/27/2022    Interval History   Full details of events over last 24 hours outlined below.   Per RN she has been comfortable, cooperative, eating and drinking, but minimal amounts. Not aware oriented to place or situation, unable to follow commands.     Assessment & Plan    Zahra is a 76-year-old woman with history of cognitive impairment and alcohol use disorder who has required significant assistance from family and friends for activities of daily living.  Her friend recently found her on the floor mildly confused and unable to get up.  Therefore EMS was called.    At presentation she was in atrial fibrillation with rapid ventricular rate, heart rate 120, temperature 97.4, blood pressure 137/95, respiratory rate 16, oxygenation 95% on room air.  Labs significant for sodium of 132, potassium 2.2, BUN 17, CR 0.75.  Liver panel unremarkable, AST just minimally elevated at 47.  Glucose 152.  Lactic acid elevated at 2.8.  INR 2.13.  CBC: WBC 12.6 (normalized to 6.1 by HD #2) hemoglobin 15.3, platelets 282.  UA negative for infection with SG of 1.021.  Alcohol level on detectable.  She was rotated on her checks x-ray, no clear evidence of infiltrate or pulmonary edema.  CT scan negative.    Goals of Care - Given severe cognitive impairment.  Called and spoke to Stefania Dang's daughter.  Given her advanced dementia and inability to really participate in cares, she was in agreement that she would best be served by focusing on comfort.  Therefore I have transitioned her over to comfort cares on 7/30.  I have ordered no comfort care meds as she exhibits no evidence of discomfort at this time hospice consult placed.     On 07/31/22, discussed with daughter Laurence. Now that patient is eating and drinking and interactive, I suspect she may not qualify for hospice. Daughter does not want  further hospitalizations, medical investigations or hospitalizations, but would like to keep her comfortable and consider outpatient mgt of minor illness if her mother tolerates and it makes her more comfortable.     Therefore, palliative care will be consulted to outline goals and fill out POLST. I discussed with SW who will look into LTC with palliative goals.     Discussed with palliative care. They recommend enrollment in hospice if she qualifies. They will see if she does not.     Advanced dementia, (Scored 1/30 on SLUMS on 7/28/22) most likely secondary to alcohol and or Alzheimer's dementia. Multi-infarct dementia is a possibility as well in context of afib. He has been progressed over the last couple of years.  *Head CT unremarkable, TSH normal, B12 643, Ammonia normal.   * EEG on 7/27 generalized continuous slowing. No seizure activity.       Thiamine level still pending on 08/01/22    Continue high-dose thiamine protocol while in hospital given history of alcohol use and high risk for malnutrition until completes course.    As above, plan is for LTC with palliative goals.     Atrial fibrillation with RVR, new diagnosis > converted ton NSR on 7/28/22   Severe hypokalemia (2 @ admission), resolved  HTN not taking any of her previously prescribed medications, lisinopril and metoprolol  *TSH normal 0.64, magnesium 2  * History of excessive alcohol use with out any known recent use in the last couple months.  BAL at admission was undetectable.   * No known history of heart disease  * Echo 7/27: EF 55%, RV nl size and function, No valve disease.       Replacing potassium per protocol, add potassium to IV fluids.     Now that she is in NSR, continue metoprolol at 25 mg BID     [id number] 2 and INR came down with vitamin K. > However, giving her severe cognitive impairment, risk of anticoagulation outweighs benefit.     Recent Labs   Lab 08/01/22  0711 07/31/22  0625 07/30/22  1029 07/30/22  0545 07/29/22  2005  "07/29/22  1100 07/28/22  1235 07/28/22  0436 07/27/22  1833 07/27/22  1346   POTASSIUM 4.1 3.4  --  3.7 3.9 3.1* 3.5 3.0*  3.0*   < > 2.2*   MAG 1.8 2.0 2.0  --   --   --   --  2.4*  --  2.0    < > = values in this interval not displayed.     Elevated INR, 2.13, RESOLVING  secondary to poor nutrition  Normal PTT and platelets. Not on anticoagulation. No clear evidence of cirrhosis by liver.  I see no prior history of imaging of the abdomen.  She does have a history of excessive alcohol use.  This may also be nutritional  * RUQ shows fatty liver without changes of cirrhosis  * Vitamin K 5 mg p.o. lindsey x 3y, with improvement in INR, likely nutritional    Recent Labs   Lab 07/29/22  0606 07/27/22  1346   INR 1.30* 2.13*         Hyperlipidemia. No longer taking Crestor    Holding statin    GERD    Resume omeprazole which she was taking previously    Severe malnutrition due to history of poor p.o. intake and alcohol use.  Very poor PO intake - BP, pulse stable.     Nutrition consult appreciated.     \"Will send meals TID with food preferences for PO encouragement. Trial Ensure mixed with ice cream at dinner.\"     Diet: Orders Placed This Encounter      Combination Diet Regular Diet Adult     IVF: None, checking BMP.   Webster Catheter: Not present     DVT Prophylaxis: Pneumatic Compression Devices  Code Status: No CPR- Do NOT Intubate     Disposition: To SNF on hospice once placement found.   Communication: Discussed with daughter and RN on 07/31/22    Blank Navarro MD  Hospitalist Service  [facility]  Securely message with the Vocera Web Console (learn more here)  Text page via [employer] Paging/Directory    -Data reviewed today: I reviewed all new labs and imaging results over the last 24 hours. I personally reviewed no images or EKG's today.    Physical Exam       BP: 123/69 Pulse: 83   Resp: 16        Vitals:    07/29/22 0520 07/30/22 0433 07/31/22 0623   Weight: 66.5 kg (146 lb 9.6 oz) 69.2 kg " (152 lb 8 oz) 68 kg (149 lb 14.6 oz)     Vital Signs with Ranges  Pulse:  [82-83] 83  Resp:  [16] 16  BP: ()/(61-69) 123/69  No intake/output data recorded.    Today's Exam  Constitutional: NAD,   Neuropsyche:  Alert, not oriented to place or situation, unable to answer most questions appropriately.  Unable to follow commands.  Respiratory: Breathing comfortably, decreased air exchange (unable to follow command to take deep breath), no wheezes, no crackles.   Cardiovascular: Irregular rate and rhythm, no edema.  GI: soft, NT/ND, BS normal  Skin/Integumen:  No acute rash or sign of bleeding.     Medications   All medications reviewed 08/01/22      metoprolol tartrate  25 mg Oral BID     pantoprazole  40 mg Oral QAM AC     sodium chloride (PF)  3 mL Intracatheter Q8H     thiamine  250 mg Intravenous Daily    Followed by     [START ON 8/4/2022] thiamine  100 mg Oral Daily       Data   Recent Labs   Lab 08/01/22  0711 07/31/22  0625 07/30/22  0545 07/29/22  1755 07/29/22  1100 07/29/22  0606 07/28/22  1235 07/28/22  0436 07/27/22  1833 07/27/22  1346   WBC  --   --  7.1  --   --   --   --  6.1  --  12.6*   HGB  --   --  11.2*  --   --   --   --  12.1  --  15.3   MCV  --   --  89  --   --   --   --  86  --  84   PLT  --   --  172  --   --   --   --  249  --  282   INR  --   --   --   --   --  1.30*  --   --   --  2.13*   NA  --   --  135  --  138  --   --  133  --  132*   POTASSIUM 4.1 3.4 3.7   < > 3.1*  --    < > 3.0*  3.0*   < > 2.2*   CHLORIDE  --   --  105  --  105  --   --  99  --  91*   CO2  --   --  24  --  27  --   --  27  --  30   BUN  --   --  11  --  12  --   --  14  --  17   CR  --   --  0.62  --  0.59  --   --  0.65  --  0.75   ANIONGAP  --   --  6  --  6  --   --  7  --  11   VIRGILIO  --   --  8.0*  --  8.2*  --   --  8.0*  --  8.9   GLC  --   --  85  --  97  --   --  128*  --  152*   ALBUMIN  --   --   --   --   --   --   --  2.1*  --  2.7*   PROTTOTAL  --   --   --   --   --   --   --  5.5*  --  7.1    BILITOTAL  --   --   --   --   --   --   --  1.0  --  1.1   ALKPHOS  --   --   --   --   --   --   --  95  --  114   ALT  --   --   --   --   --   --   --  23  --  31   AST  --   --   --   --   --   --   --  37  --  47*    < > = values in this interval not displayed.       No results found for this or any previous visit (from the past 24 hour(s)).

## 2022-08-01 NOTE — PROGRESS NOTES
Care Management Follow Up    Length of Stay (days): 5    Expected Discharge Date: 08/03/2022     Concerns to be Addressed:       Patient plan of care discussed at interdisciplinary rounds: Yes    Anticipated Discharge Disposition: Long Term Care     Anticipated Discharge Services: None  Anticipated Discharge DME: None    Patient/family educated on Medicare website which has current facility and service quality ratings: no  Education Provided on the Discharge Plan:    Patient/Family in Agreement with the Plan: yes    Referrals Placed by CM/SW: Financial Services, Community Resources  Private pay costs discussed:Not applicable    Additional Information:  Palliative called writer asking writer to reach out to a hospice agency to see if patient would qualify for hospice. Writer messaged [facility] asking if patient would qualify for hospice. [place] said that patient is hospice appropriate. He said that if she was interested in [facility]'s, they would be able to accept wed or Thursday. Told him we would let him know once they decide on an agency.    Writer called [facility] and asked if they could review referral for patient. Zaina from [facility] said that patient doesn't meet their dementia criteria for hospice. She said that if patient gets to the point where she's bed bound, then writer could resend referral to them.    Writer emailed Luis (financial counselor) asking if she worked on the MA roverto with Laurence (serenar) yet. Luis said that she just completed the MA roverto via phone with Laurence (dtr). She said that she just emailed the signature page to sign and Laurence is trying to locate the bank statements. Writer asked for an email for when the MA roverto is submitted.    Addendum: Luis emailed stating that she submitted the MA application to [facility]. Writer sent out two LTC referrals MA pending to [facility] and [facility] since they prefer a facility in [place]  [facility] as discussed yesterday with Laurence (dtr) and Servando (son) .     Will continue to follow.    MANOJ Rodriguez

## 2022-08-01 NOTE — PLAN OF CARE
DATE & TIME: 8/1/22 6033-2991   Cognitive Concerns/ Orientation : Alert to self.  ABNL VS/O2: BP and HR WDL  MOBILITY: Up with 1gb and walker  PAIN MANAGMENT: Denies  DIET: Reg diet, needs tray set up and encouragement.  BOWEL/BLADDER: Up to BR, incontinent at times.  ABNL LAB/BG: None drawn  DRAIN/DEVICES: PIV SL  SKIN: Scattered bruising  OTHER IMPORTANT INFO: Patient on comfort cares. Palliative consulted yesterday 7/31/22. Will continue to monitor.

## 2022-08-01 NOTE — PLAN OF CARE
Goal Outcome Evaluation:    Comfort cares, full assessment deferred. Alert, oriented to self only. No signs or complaints of pain. Regular diet, needs assistance with feeding. Ambulated A1 GB/W to bathroom. Turn/repo q2h. Mepilex to coccyx. Palliative consulted to assess plans. Discharge likely to LTC.

## 2022-08-02 NOTE — PROGRESS NOTES
Patient is now a comfort care patient./ Patient tolerated lunch ok. Assist of 1 transfer. Family members were not present during shift. Hospice planning underway, per Provider.

## 2022-08-02 NOTE — PLAN OF CARE
[id number] shift. Continuing comfort cares. Pleasantly confused, orientated to self only. Repos in bed. Got up to chair for supper w/ 1 asist gb/walker. Ate a small amount. Incontinent. SW following for Hospice placement

## 2022-08-02 NOTE — PROGRESS NOTES
Palliative chart check. Per unit SW note, pt does qualify for hospice. Hospice planning underway. Will cancel palliative consult at this time. Please do not hesitate to contact me if new issues arise. Thanks.    TIFFANIE Sauer [facility]  Contact information available via [facility] Paging/Directory

## 2022-08-02 NOTE — PROGRESS NOTES
[facility]    Hospitalist Progress Note    Date of Service (when I saw the patient): 08/02/2022  Admit date: 7/27/2022    Interval History   Full details of events over last 24 hours outlined below.   Per RN she has been comfortable, cooperative, eating and drinking, but minimal amounts. Not aware oriented to place or situation, unable to follow commands.   No change in last 24 hours.     Assessment & Plan    Zahra is a 76-year-old woman with history of cognitive impairment and alcohol use disorder who has required significant assistance from family and friends for activities of daily living.  Her friend recently found her on the floor mildly confused and unable to get up.  Therefore EMS was called.    At presentation she was in atrial fibrillation with rapid ventricular rate, heart rate 120, temperature 97.4, blood pressure 137/95, respiratory rate 16, oxygenation 95% on room air.  Labs significant for sodium of 132, potassium 2.2, BUN 17, CR 0.75.  Liver panel unremarkable, AST just minimally elevated at 47.  Glucose 152.  Lactic acid elevated at 2.8.  INR 2.13.  CBC: WBC 12.6 (normalized to 6.1 by HD #2) hemoglobin 15.3, platelets 282.  UA negative for infection with SG of 1.021.  Alcohol level on detectable.  She was rotated on her checks x-ray, no clear evidence of infiltrate or pulmonary edema.  CT scan negative.    Goals of Care - Given severe cognitive impairment.  Called and spoke to Stefania Dang's daughter.  Given her advanced dementia and inability to really participate in cares, she was in agreement that she would best be served by focusing on comfort.  Therefore I have transitioned her over to comfort cares on 7/30.  I have ordered no comfort care meds as she exhibits no evidence of discomfort at this time hospice consult placed.     On 07/31/22, discussed with daughter Laurence. Now that patient is eating and drinking and interactive, I suspect she may not qualify for  "hospice. Daughter does not want further hospitalizations, medical investigations or hospitalizations, but would like to keep her comfortable and consider outpatient mgt of minor illness if her mother tolerates and it makes her more comfortable.     Accepted into Hospice, so palliative care consult discontinued.     Called to speak with daughter. She has not yet heard from Hospice.  I explained the hospice follow-up philosophy and focus on comfort. No further medical investigations or inpatient treatment.  Daughter states she is \"100% in agreement\" with this plan. She wants strict focus on comfort.     Advanced dementia, (Scored 1/30 on SLUMS on 7/28/22) most likely secondary to alcohol and or Alzheimer's dementia. Multi-infarct dementia is a possibility as well in context of afib. He has been progressed over the last couple of years.  *Head CT unremarkable, TSH normal, B12 643, Ammonia normal.   * EEG on 7/27 generalized continuous slowing. No seizure activity.       Thiamine level still pending on 08/02/22.     Continue high-dose thiamine protocol while in hospital given history of alcohol use and high risk for malnutrition until completes course.    As above, plan is for LTC with palliative goals.      Atrial fibrillation with RVR, new diagnosis > converted ton NSR on 7/28/22   Severe hypokalemia (2 @ admission), resolved  HTN not taking any of her previously prescribed medications, lisinopril and metoprolol  *TSH normal 0.64, magnesium 2  * History of excessive alcohol use with out any known recent use in the last couple months.  BAL at admission was undetectable.   * No known history of heart disease  * Echo 7/27: EF 55%, RV nl size and function, No valve disease.       Replacing potassium per protocol, add potassium to IV fluids.     Now that she is in NSR, continue metoprolol at 25 mg BID     [id number] 2 and INR came down with vitamin K. > However, giving her severe cognitive impairment, risk of " "anticoagulation outweighs benefit.     Recent Labs   Lab 08/02/22  0832 08/01/22  0711 07/31/22  0625 07/30/22  1029 07/30/22  0545 07/29/22  1755 07/29/22  1100 07/28/22  1235 07/28/22  0436 07/27/22  1833 07/27/22  1346   POTASSIUM 3.8 4.1 3.4  --  3.7 3.9 3.1*   < > 3.0*  3.0*   < > 2.2*   MAG 1.9 1.8 2.0 2.0  --   --   --   --  2.4*  --  2.0    < > = values in this interval not displayed.     Elevated INR, 2.13, RESOLVING  secondary to poor nutrition  Normal PTT and platelets. Not on anticoagulation. No clear evidence of cirrhosis by liver.  I see no prior history of imaging of the abdomen.  She does have a history of excessive alcohol use.  This may also be nutritional  * RUQ shows fatty liver without changes of cirrhosis  * Vitamin K 5 mg p.o. lindsey x 3d, with improvement in INR after one dose, likely nutritional.     Recent Labs   Lab 07/29/22  0606 07/27/22  1346   INR 1.30* 2.13*       Hyperlipidemia. No longer taking Crestor    Holding statin    GERD    Resume omeprazole which she was taking previously    Severe malnutrition due to history of poor p.o. intake and alcohol use.  Very poor PO intake - BP, pulse stable.     Nutrition consult appreciated.     \"Will send meals TID with food preferences for PO encouragement. Trial Ensure mixed with ice cream at dinner.\"     Diet: Orders Placed This Encounter      Combination Diet Regular Diet Adult     IVF: None, checking BMP.   Webster Catheter: Not present     DVT Prophylaxis: Pneumatic Compression Devices  Code Status: No CPR- Do NOT Intubate     Disposition: To SNF on hospice once placement found.   Communication: Discussed with daughter and RN on 08/02/22    Blank Navarro MD  Hospitalist Service  [facility]  Securely message with the Vocera Web Console (learn more here)  Text page via Tosk Paging/Directory    -Data reviewed today: I reviewed all new labs and imaging results over the last 24 hours. I personally reviewed no images " or EKG's today.    Physical Exam   Temp: 97.7  F (36.5  C) Temp src: Oral BP: 112/67 Pulse: 76   Resp: 18 SpO2: 93 % O2 Device: None (Room air)    Vitals:    07/29/22 0520 07/30/22 0433 07/31/22 0623   Weight: 66.5 kg (146 lb 9.6 oz) 69.2 kg (152 lb 8 oz) 68 kg (149 lb 14.6 oz)     Vital Signs with Ranges  Temp:  [97.7  F (36.5  C)] 97.7  F (36.5  C)  Pulse:  [76-87] 76  Resp:  [18] 18  BP: (112-123)/(60-67) 112/67  SpO2:  [93 %] 93 %  I/O last 3 completed shifts:  In: 580 [P.O.:580]  Out: -     Today's Exam  Constitutional: NAD,   Neuropsyche:  Alert, not oriented to place or situation, unable to answer most questions appropriately.  Unable to follow commands.  Respiratory: Breathing comfortably, decreased air exchange (unable to follow command to take deep breath), no wheezes, no crackles.   Cardiovascular: Regular rate and rhythm, no edema.  GI: soft, NT/ND, BS normal  Skin/Integumen:  No acute rash or sign of bleeding.     Medications   All medications reviewed 08/02/22      metoprolol tartrate  25 mg Oral BID     pantoprazole  40 mg Oral QAM AC     sodium chloride (PF)  3 mL Intracatheter Q8H     thiamine  250 mg Intravenous Daily    Followed by     [START ON 8/4/2022] thiamine  100 mg Oral Daily       Data   Recent Labs   Lab 08/02/22  0832 08/01/22  0711 07/31/22  0625 07/30/22  0545 07/29/22  1755 07/29/22  1100 07/29/22  0606 07/28/22  1235 07/28/22  0436 07/27/22  1833 07/27/22  1346   WBC  --   --   --  7.1  --   --   --   --  6.1  --  12.6*   HGB  --   --   --  11.2*  --   --   --   --  12.1  --  15.3   MCV  --   --   --  89  --   --   --   --  86  --  84   PLT  --   --   --  172  --   --   --   --  249  --  282   INR  --   --   --   --   --   --  1.30*  --   --   --  2.13*   NA  --   --   --  135  --  138  --   --  133  --  132*   POTASSIUM 3.8 4.1 3.4 3.7   < > 3.1*  --    < > 3.0*  3.0*   < > 2.2*   CHLORIDE  --   --   --  105  --  105  --   --  99  --  91*   CO2  --   --   --  24  --  27  --   --   27  --  30   BUN  --   --   --  11  --  12  --   --  14  --  17   CR  --   --   --  0.62  --  0.59  --   --  0.65  --  0.75   ANIONGAP  --   --   --  6  --  6  --   --  7  --  11   VIRGILIO  --   --   --  8.0*  --  8.2*  --   --  8.0*  --  8.9   GLC  --   --   --  85  --  97  --   --  128*  --  152*   ALBUMIN  --   --   --   --   --   --   --   --  2.1*  --  2.7*   PROTTOTAL  --   --   --   --   --   --   --   --  5.5*  --  7.1   BILITOTAL  --   --   --   --   --   --   --   --  1.0  --  1.1   ALKPHOS  --   --   --   --   --   --   --   --  95  --  114   ALT  --   --   --   --   --   --   --   --  23  --  31   AST  --   --   --   --   --   --   --   --  37  --  47*    < > = values in this interval not displayed.       No results found for this or any previous visit (from the past 24 hour(s)).

## 2022-08-02 NOTE — PROGRESS NOTES
CLINICAL NUTRITION SERVICES    Chart reviewed for patient due for nutrition follow up visit today. Noted informed decision made to change pt s status to comfort care. Planning to discharge when hospice placement found. No further nutrition interventions planned at this time. RD can be consulted if needed.    RD signing off on 8/2/2022.    Wanda Guerrero RD, LD

## 2022-08-02 NOTE — PLAN OF CARE
Pt had a good evening. Continuing comfort cares. Pleasantly confused, orientated to self only. Repos in bed. Got up to chair for supper w/ 1 asist gb/walker. Ate a small amount. Incontinent. SW following for Hospice placement.

## 2022-08-02 NOTE — PLAN OF CARE
Comfort cares continued. Alert and oriented to self only. VS and a full assessment deferred. On RA. Denies pain. Incontinent of B&B, no BM. Blanchable redness to coccyx, mepilex in place. T/R q2h. Up A1 with GB+W. On regular diet, poor appetite. L PIV SL. Hospice placement pending, SW following.

## 2022-08-02 NOTE — PROGRESS NOTES
Care Management Follow Up    Length of Stay (days): 6    Expected Discharge Date: 08/03/2022     Concerns to be Addressed:     Discharge planning with hospice.   Patient plan of care discussed at interdisciplinary rounds: Yes    Anticipated Discharge Disposition: Long Term Care     Anticipated Discharge Services: None  Anticipated Discharge DME: None    Patient/family educated on Medicare website which has current facility and service quality ratings: no  Education Provided on the Discharge Plan:    Patient/Family in Agreement with the Plan: yes    Referrals Placed by CM/SW: Financial Services, Community Resources  Private pay costs discussed: Not applicable    Additional Information:  SW placed calls to remaining facilities to inquire about availability for patient.      [facility]: Left  to inquire about placement.    Sand[facility] AV: Will review patient and call back to SW with update.     KENNEDY Viera, LGSW  [phone]  [facility]

## 2022-08-03 NOTE — PLAN OF CARE
Comfort cares continued. Alert and oriented to self only. VS and a full assessment deferred. On RA. Denies pain. Incontinent of B&B, no BM. Blanchable redness to coccyx, mepilex in place. T/R q2h. Up A1 with GB+W. On regular diet. L PIV SL. Hospice placement pending, SW following.

## 2022-08-03 NOTE — PROGRESS NOTES
[facility]    Hospitalist Progress Note    Assessment & Plan   Zahra Pulido is a 76 year old female with PMHx of hypertension, hyperlipidemia, GERD, hx of DVT vs superficial venous thrombosis not on chronic anticoagulation, cognitive impairment and alcohol use disorder who at baseline requires significant assistance from family/friends for ADLs. She was admitted on 7/27/2022 after she was found on the floor, confused and unable to get up. Initially found to be in afib with RVR (HR 120s). Labs notable for K 2.2, lactate 2.8. INR 2.1 BAL neg.     Given underlying severe cog impairment and general failure to thrive, goals of care discussed this stay and patient was ultimately transitioned to comfort cares with plans to enroll in hospice.     Comfort Care patient  Failure to Thrive  Advanced dementia -- scored 1/30 on SLUMS eval on 7/28/22  * Dementia presumed secondary to alcohol use and/or Alzheimer's dementia. Multi-infarct dementia is a possibility as well in context of afib. He has been progressed over the last couple of years.   * Head CT unremarkable, TSH and B12 nl. Ammonia level nl. Thiamine level low. EEG on 7/27 showed generalized continuous slowing but no seizure activity. Seen by general neurology this stay. Started on high dose thiamine protocol  * Hospitalist discussed goals of care discussed with patient's daughter Laurence this stay. Wanted to focus on comfort. In agreement with transition to comfort cares. Patient made comfort cares on 7/30. Qualifies for hospice cares.   -- continue comfort cares in hospital  -- discharge to LTC with hospice, SW following    Afib with RVR on admission, converted to NSR on 7/28  Hypertension  Hyperlipidemia  Elevated lactate w/o evidence of sepsis, likely dt RVR: resolved  * Previously prescribed metoprolol and lisinopril but was not taking prior to admision. No hx of arrhythmia.   * Found to be in rapid afib on admission. Initially placed  on dilt gtt. Converted to NSR on 7/28. Placed on oral metoprolol. Echo this stay showed EF 55%, no valve disease. CHADS2 VASC score is 2 but anticoagulation deferred dt risks> benefits  -- cont metoprolol 25mg BID  -- defer other statin given focus on comfort    Severe malnutrition  Severe hypokalemia: Resolved  Thiamine deficiency  Elevated INR dt malnutrition: Resolved  * Malnutrition is multifactorial, dt poor po intake in setting of dementia and alcohol use.   * K 2.2 on admission. Secondary to malnutrition. Replaced during stay.    * Thiamine level low. Started on replacement this stay. Cont oral thiamine for now.   * INR elevated at 2.1 on admission. Not on anticoagulation. No evidence of liver disease. Given vitamin K (5mg po x3d). INR normalized after 1 dose of vitamin K so presumed dt malnutrition  * Oral intake as desired given focus on comfort.     GERD  * Chronic and stable on PPI, which has been continued for comfort    FEN: no IVFs, lytes stable, regular diet with po intake as desired  DVT Prophylaxis: none needed given comfort cares  Code Status: No CPR- Do NOT Intubate    Disposition: Continue comfort cares. Discharge to LTC with hospice once placement found.     Gabriella Medina, DO    Interval History   Seen this morning. Resting comfortably. No reported cp/sob/cough, abd pain/n/v. Taking po.     -Data reviewed today: I reviewed all new labs and imaging results over the last 24 hours. I personally reviewed no images or EKG's today.    Physical Exam   Temp: (P) 97.9  F (36.6  C) Temp src: Oral BP: 97/65 Pulse: 84   Resp: 16 SpO2: 93 % O2 Device: None (Room air)    Vitals:    07/29/22 0520 07/30/22 0433 07/31/22 0623   Weight: 66.5 kg (146 lb 9.6 oz) 69.2 kg (152 lb 8 oz) 68 kg (149 lb 14.6 oz)     Vital Signs with Ranges  Temp:  [97.7  F (36.5  C)-97.9  F (36.6  C)] (P) 97.9  F (36.6  C)  Pulse:  [76-84] 84  Resp:  [16-18] 16  BP: ()/(65-67) 97/65  SpO2:  [93 %] 93 %  I/O last 3  completed shifts:  In: 680 [P.O.:680]  Out: -     Constitutional: Resting comfortably, alert, pleasantly confused, NAD  Respiratory: CTAB, no wheeze/rales/rhonchi, no increased work of breathing  Cardiovascular: HRRR, no MGR, no LE edema  GI: S, NT, ND, +BS  Skin/Integumen: warm/dry  Other:      Medications       metoprolol tartrate  25 mg Oral BID     pantoprazole  40 mg Oral QAM AC     sodium chloride (PF)  3 mL Intracatheter Q8H     thiamine  250 mg Intravenous Daily    Followed by     [START ON 8/4/2022] thiamine  100 mg Oral Daily       Data   Recent Labs   Lab 08/02/22  0832 08/01/22  0711 07/31/22  0625 07/30/22  0545 07/29/22  1755 07/29/22  1100 07/29/22  0606 07/28/22  1235 07/28/22  0436 07/27/22  1833 07/27/22  1346   WBC  --   --   --  7.1  --   --   --   --  6.1  --  12.6*   HGB  --   --   --  11.2*  --   --   --   --  12.1  --  15.3   MCV  --   --   --  89  --   --   --   --  86  --  84   PLT  --   --   --  172  --   --   --   --  249  --  282   INR  --   --   --   --   --   --  1.30*  --   --   --  2.13*   NA  --   --   --  135  --  138  --   --  133  --  132*   POTASSIUM 3.8 4.1 3.4 3.7   < > 3.1*  --    < > 3.0*  3.0*   < > 2.2*   CHLORIDE  --   --   --  105  --  105  --   --  99  --  91*   CO2  --   --   --  24  --  27  --   --  27  --  30   BUN  --   --   --  11  --  12  --   --  14  --  17   CR  --   --   --  0.62  --  0.59  --   --  0.65  --  0.75   ANIONGAP  --   --   --  6  --  6  --   --  7  --  11   VIRGILIO  --   --   --  8.0*  --  8.2*  --   --  8.0*  --  8.9   GLC  --   --   --  85  --  97  --   --  128*  --  152*   ALBUMIN  --   --   --   --   --   --   --   --  2.1*  --  2.7*   PROTTOTAL  --   --   --   --   --   --   --   --  5.5*  --  7.1   BILITOTAL  --   --   --   --   --   --   --   --  1.0  --  1.1   ALKPHOS  --   --   --   --   --   --   --   --  95  --  114   ALT  --   --   --   --   --   --   --   --  23  --  31   AST  --   --   --   --   --   --   --   --  37  --  47*    < > =  values in this interval not displayed.       No results found for this or any previous visit (from the past 24 hour(s)).

## 2022-08-03 NOTE — PLAN OF CARE
Plan for discharge tonight to [facility] with plans to sign on to Hospice. Awaiting [facility] stretcher ride. Covid Booster given. PIV removed. AVS paperwork placed in pt's belongings. Packet and prescriptions ready to give to transport.

## 2022-08-03 NOTE — DISCHARGE INSTRUCTIONS
Reposition for comfort,   2  Utilize gait-belt and walker for transfers to chair and for ambulation  3. Requires assist with meal set-up  4. Requires assist with oral cares and bathing

## 2022-08-03 NOTE — DISCHARGE SUMMARY
[facility]    Discharge Summary  Hospitalist    Date of Admission:  7/27/2022  Date of Discharge:  8/3/2022  Discharging Provider: Gabriella Medina,     Discharge Diagnoses   Comfort Care patient  Failure to Thrive  Advanced dementia -- scored 1/30 on SLUMS eval on 7/28/22  Afib with RVR on admission, converted to NSR on 7/28/22  Hypertension  Hyperlipidemia  Elevated lactate w/o evidence of sepsis, likely dt RVR: resolved  Severe malnutrition  Severe hypokalemia: Resolved  Thiamine deficiency  Elevated INR dt malnutrition: Resolved  Hx of alcohol use disorder    History of Present Illness   Zahra Pulido is a 76 year old female with PMHx of hypertension, hyperlipidemia, GERD, hx of DVT vs superficial venous thrombosis not on chronic anticoagulation, cognitive impairment and alcohol use disorder who at baseline requires significant assistance from family/friends for ADLs. She was admitted on 7/27/2022 after she was found on the floor, confused and unable to get up. Initially found to be in afib with RVR (HR 120s). Labs notable for K 2.2, lactate 2.8. INR 2.1 BAL neg.      Given underlying severe cog impairment and general failure to thrive, goals of care discussed this stay and patient was ultimately transitioned to comfort cares with plans to enroll in hospice.     Hospital Course   Zahra Pulido was admitted on 7/27/2022.  The following problems were addressed during her hospitalization:    Comfort Care patient  Failure to Thrive  Advanced dementia -- scored 1/30 on SLUMS eval on 7/28/22  * Dementia presumed secondary to alcohol use and/or Alzheimer's dementia. Multi-infarct dementia is a possibility as well in context of afib. He has been progressed over the last couple of years.   * Head CT unremarkable, TSH and B12 nl. Ammonia level nl. Thiamine level low. EEG on 7/27 showed generalized continuous slowing but no seizure activity. Seen by general neurology this stay.  Started on high dose thiamine protocol  * Hospitalist discussed goals of care discussed with patient's daughter Laurence this stay. Wanted to focus on comfort. In agreement with transition to comfort cares. Patient made comfort cares on 7/30. Qualifies for hospice cares.   * Discharged to LTC facility with hospice enrollment  * 3d supply of comfort meds ordered at discharge (though none needed during hospital stay)     Afib with RVR on admission, converted to NSR on 7/28/22  Hypertension  Hyperlipidemia  Elevated lactate w/o evidence of sepsis, likely dt RVR: resolved  * Previously prescribed metoprolol and lisinopril but was not taking prior to admision. No hx of arrhythmia.   * Found to be in rapid afib on admission. Initially placed on dilt gtt. Converted to NSR on 7/28. Placed on oral metoprolol. Echo this stay showed EF 55%, no valve disease. CHADS2 VASC score is 2 but anticoagulation deferred dt risks> benefits  * Will cont metoprolol 25mg BID at discharge, defer statin given focus on comfort     Severe malnutrition  Severe hypokalemia: Resolved  Thiamine deficiency  Elevated INR dt malnutrition: Resolved  Hx of alcohol use disorder  * Malnutrition is multifactorial, dt poor po intake in setting of dementia and alcohol use.   * K 2.2 on admission. Secondary to malnutrition. Replaced during stay.    * Thiamine level low. Started on replacement this stay. Cont oral thiamine for now.   * INR elevated at 2.1 on admission. Not on anticoagulation. No evidence of liver disease. Given vitamin K (5mg po x3d). INR normalized after 1 dose of vitamin K so presumed dt malnutrition  * Oral intake as desired given focus on comfort.     Gabriella Medina,     Code Status   DNR / DNI       Primary Care Physician   Physician No Ref-Primary    Physical Exam   Temp: (P) 97.9  F (36.6  C) Temp src: Oral BP: 96/54 Pulse: 87   Resp: 16 SpO2: 94 % O2 Device: None (Room air)    Vitals:    07/29/22 0520 07/30/22 0433 07/31/22  0623   Weight: 66.5 kg (146 lb 9.6 oz) 69.2 kg (152 lb 8 oz) 68 kg (149 lb 14.6 oz)     Vital Signs with Ranges  Temp:  [97.7  F (36.5  C)-97.9  F (36.6  C)] (P) 97.9  F (36.6  C)  Pulse:  [76-87] 87  Resp:  [16-18] 16  BP: ()/(54-67) 96/54  SpO2:  [93 %-94 %] 94 %  I/O last 3 completed shifts:  In: 680 [P.O.:680]  Out: -     Constitutional: Resting comfortably, alert, pleasantly confused, NAD  Respiratory: CTAB, no wheeze/rales/rhonchi, no increased work of breathing  Cardiovascular: HRRR, no MGR, no LE edema  GI: S, NT, ND, +BS  Skin/Integumen: warm/dry  Neuro: CNs 2-12 intact, no focal motor/sensory deficits    Discharge Disposition   Discharged to long-term care facility with plans to enroll in hospice  Condition at discharge: Stable    Consultations This Hospital Stay   NEUROLOGY IP CONSULT  ----------------------------  CARE MANAGEMENT / SOCIAL WORK IP CONSULT  PHYSICAL THERAPY ADULT IP CONSULT  OCCUPATIONAL THERAPY ADULT IP CONSULT  NUTRITION SERVICES ADULT IP CONSULT    Time Spent on this Encounter   IGabriella DO, personally saw the patient today and spent greater than 30 minutes discharging this patient.    Discharge Orders      General info for SNF    Length of Stay Estimate: Short Term Care: Estimated # of Days 31-90  Condition at Discharge: Terminal  Level of care:skilled   Rehabilitation Potential: Poor  Admission H&P remains valid and up-to-date: Yes  Recent Chemotherapy: N/A  Use Nursing Home Standing Orders: Yes     Mantoux instructions    Give two-step Mantoux (PPD) Per Facility Policy Yes     Reason for your hospital stay    Evaluation of your acute medical problems and transition to hospice care.     Follow Up and recommended labs and tests    Enroll with hospice care agency after discharge.     Activity - Up with nursing assistance     Advance Diet as Tolerated    Follow this diet upon discharge: Regular     Discharge Medications   Current Discharge Medication List       START taking these medications    Details   acetaminophen (TYLENOL) 325 MG tablet Take 2 tablets (650 mg) by mouth every 6 hours as needed for mild pain or other (and adjunct with moderate or severe pain or per patient request)    Associated Diagnoses: Hospice care patient      atropine 1 % ophthalmic solution Take 1 drop by mouth, place under tongue or place inside cheek every 4 hours as needed for secretions  Qty: 5 mL, Refills: 0    Associated Diagnoses: Hospice care patient      HYDROmorphone (DILAUDID) 2 MG tablet Take 0.5 tablets (1 mg) by mouth or place under tongue every 2 hours as needed for pain or shortness of breath / dyspnea Hospice patient. Dispense in quantities of 30 tablets.  Qty: 10 tablet, Refills: 0    Comments: Hospice patient. Dispense in quantities of 30 mL.  Associated Diagnoses: Hospice care patient      LORazepam (ATIVAN) 0.5 MG tablet Take 1 tablet (0.5 mg) by mouth or place under tongue every 4 hours as needed for anxiety (restlessness)  Qty: 10 tablet, Refills: 0    Associated Diagnoses: Hospice care patient      ondansetron (ZOFRAN ODT) 4 MG ODT tab Take 1 tablet (4 mg) by mouth every 6 hours as needed for nausea or vomiting    Associated Diagnoses: Hospice care patient      pantoprazole (PROTONIX) 40 MG EC tablet Take 1 tablet (40 mg) by mouth every morning (before breakfast)    Associated Diagnoses: Gastroesophageal reflux disease without esophagitis      senna-docusate (SENOKOT-S/PERICOLACE) 8.6-50 MG tablet Take 1 tablet by mouth 2 times daily as needed for constipation    Associated Diagnoses: Hospice care patient      thiamine (B-1) 100 MG tablet Take 1 tablet (100 mg) by mouth daily    Associated Diagnoses: Thiamine deficiency         CONTINUE these medications which have CHANGED    Details   metoprolol tartrate (LOPRESSOR) 25 MG tablet Take 1 tablet (25 mg) by mouth 2 times daily    Associated Diagnoses: Atrial fibrillation with RVR (H)         STOP taking these medications        omeprazole (PRILOSEC) 20 MG DR capsule Comments:   Reason for Stopping:             Allergies   Allergies   Allergen Reactions     Penicillins Anaphylaxis     Face became swollen in her teenage years when she took PCN     Data   Most Recent 3 CBC's:Recent Labs   Lab Test 07/30/22  0545 07/28/22  0436 07/27/22  1346   WBC 7.1 6.1 12.6*   HGB 11.2* 12.1 15.3   MCV 89 86 84    249 282      Most Recent 3 BMP's:  Recent Labs   Lab Test 08/02/22  0832 08/01/22  0711 07/31/22  0625 07/30/22  0545 07/29/22  1755 07/29/22  1100 07/28/22  1235 07/28/22  0436   NA  --   --   --  135  --  138  --  133   POTASSIUM 3.8 4.1 3.4 3.7   < > 3.1*   < > 3.0*  3.0*   CHLORIDE  --   --   --  105  --  105  --  99   CO2  --   --   --  24  --  27  --  27   BUN  --   --   --  11  --  12  --  14   CR  --   --   --  0.62  --  0.59  --  0.65   ANIONGAP  --   --   --  6  --  6  --  7   VIRGILIO  --   --   --  8.0*  --  8.2*  --  8.0*   GLC  --   --   --  85  --  97  --  128*    < > = values in this interval not displayed.     Most Recent 2 LFT's:  Recent Labs   Lab Test 07/28/22  0436 07/27/22  1346   AST 37 47*   ALT 23 31   ALKPHOS 95 114   BILITOTAL 1.0 1.1     Most Recent INR's and Anticoagulation Dosing History:  Anticoagulation Dose History     Recent Dosing and Labs Latest Ref Rng & Units 2/9/2006 2/23/2006 3/2/2006 3/13/2006 3/24/2006 7/27/2022 7/29/2022    INR 0.85 - 1.15 1.8 1.4 2.57(H) 2.8 2.1 2.13(H) 1.30(H)        Most Recent TSH, T4 and A1c Labs:  Recent Labs   Lab Test 07/27/22  1346 02/01/20  0946 09/08/18  0921   TSH 0.64 2.18 1.28   T4  --  1.03 1.16   A1C  --   --  5.4     Results for orders placed or performed during the hospital encounter of 07/27/22   XR Chest Port 1 View    Narrative    XR CHEST PORT 1 VIEW   7/27/2022 2:29 PM     HISTORY: palpitations    COMPARISON: None available.      Impression    IMPRESSION: Patient is rotated to the right. Within this limitation,  cardiomediastinal silhouette is normal in size.  There is a large  hiatal hernia. No focal airspace disease, pleural effusion or  pneumothorax. No acute bony abnormality.    FAUZIA MIR MD         SYSTEM ID:  [id number]   CT Head w/o Contrast    Narrative    CT SCAN OF THE HEAD WITHOUT CONTRAST  July 27, 2022 2:22 PM     HISTORY: Found on ground with altered mental status.    TECHNIQUE: Axial images of the head and coronal reformations without  IV contrast material. Radiation dose for this scan was reduced using  automated exposure control, adjustment of the mA and/or kV according  to patient size, or iterative reconstruction technique.    COMPARISON: None.    FINDINGS: Mild volume loss is present. White matter hypoattenuation  likely represents mild chronic small vessel ischemic change. The  cerebral hemispheres, brainstem, and cerebellum otherwise demonstrate  normal morphology and attenuation. No evidence of acute ischemia,  hemorrhage, mass, mass effect or hydrocephalus. The visualized  calvarium, tympanic cavities, mastoid cavities, and paranasal sinuses  are unremarkable. Nonspecific soft tissue filling the right external  auditory canal, presumably cerumen.      Impression    IMPRESSION: No acute intracranial abnormality.    ATIYA ARMSTRONG MD         SYSTEM ID:  [id number]   US Abdomen Limited    Narrative    EXAM: US ABDOMEN LIMITED  LOCATION: [facility]  DATE/TIME: 7/28/2022 5:13 PM    INDICATION: elevated INR, history of drinking evaluate for cirrhosis.  COMPARISON: None.  TECHNIQUE: Limited abdominal ultrasound.    FINDINGS:    GALLBLADDER: Normal. No gallstones, wall thickening, or pericholecystic fluid. Negative sonographic Nino's sign.    BILE DUCTS: No biliary dilatation. The common duct measures 2 mm.    LIVER: Diffusely increased echogenicity of hepatic parenchyma with smooth contour, although exam is technically difficult as patient is unable to hold their breath. No focal mass.    RIGHT KIDNEY: No  hydronephrosis.    PANCREAS: The pancreas is largely obscured by overlying gas.    No ascites.      Impression    IMPRESSION:  1.  Likely hepatic steatosis without definite morphologic changes of cirrhosis.  2.  No ascites.     Echocardiogram Complete     Value    LVEF  55%    [facility]    [id number]  [id number]  [id number]^SANG^SANDRA^MILI     [facility]  Echocardiography Laboratory  [address] [place] [address]     Name: TRUPTI NASH  MRN: [id number]  : 1945  Study Date: 2022 09:31 AM  Age: 76 yrs  Gender: Female  Patient Location: [facility]  Reason For Study: Atrial Fibrillation  Ordering Physician: SANDRA DOMÍNGUEZ  Performed By: Blade Kaiser     BSA: 1.7 m2  Height: 66 in  Weight: 146 lb  HR: 79  BP: 125/94 mmHg  ______________________________________________________________________________  Procedure  Complete Portable Echo Adult. Optison (NDC #[id number]) given intravenously.  ______________________________________________________________________________  Interpretation Summary     1. The left ventricle is normal in structure, function and size. The visual  ejection fraction is estimated at 55%.  2. The right ventricle is normal in structure, function and size.  3. No valve disease.     No previous echo for comparison.  ______________________________________________________________________________  Left Ventricle  The left ventricle is normal in structure, function and size. There is normal  left ventricular wall thickness. The visual ejection fraction is estimated at  55%. Left ventricular diastolic function is indeterminate. Normal left  ventricular wall motion.     Right Ventricle  The right ventricle is normal in structure, function and size.     Atria  The left atrium is moderate to severely dilated. The right atrium is mildly  dilated. There is no atrial shunt seen.     Mitral Valve  The mitral valve leaflets appear thickened, but open well. There is  trace  mitral regurgitation.     Tricuspid Valve  There is trace to mild tricuspid regurgitation. The right ventricular systolic  pressure is approximated at 7.4 mmHg plus the right atrial pressure.     Aortic Valve  There is mild trileaflet aortic sclerosis.     Pulmonic Valve  The pulmonic valve is normal in structure and function.     Vessels  The aortic root is not well visualized. The inferior vena cava was normal in  size with preserved respiratory variability.     Pericardium  There is no pericardial effusion.     Rhythm  Rhythm likely sinus with PAC's.  ______________________________________________________________________________  MMode/2D Measurements & Calculations     IVSd: 0.91 cm  LVIDd: 3.6 cm  LVIDs: 2.9 cm  LVPWd: 0.97 cm  FS: 20.0 %  LV mass(C)d: 100.7 grams  LV mass(C)dI: 57.6 grams/m2  Ao root diam: 3.1 cm  LVOT diam: 1.8 cm  LVOT area: 2.5 cm2  LA Volume (BP): 94.9 ml  LA Volume Index (BP): 54.2 ml/m2  RWT: 0.54     Doppler Measurements & Calculations  MV E max juan: 108.0 cm/sec  MV A max juan: 105.7 cm/sec  MV E/A: 1.0  MV max P.1 mmHg  MV mean P.9 mmHg  MV V2 VTI: 32.8 cm  MVA(VTI): 1.7 cm2  MV dec slope: 507.4 cm/sec2  Ao V2 max: 158.0 cm/sec  Ao max PG: 10.0 mmHg  Ao V2 mean: 115.7 cm/sec  Ao mean P.0 mmHg  Ao V2 VTI: 38.7 cm  SAGAR(I,D): 1.4 cm2  SAGAR(V,D): 1.4 cm2  LV V1 max PG: 3.4 mmHg  LV V1 max: 91.7 cm/sec  LV V1 VTI: 22.6 cm  SV(LVOT): 56.1 ml  SI(LVOT): 32.0 ml/m2  TR max juan: 135.7 cm/sec  TR max P.4 mmHg  AV Juan Ratio (DI): 0.58  SAGAR Index (cm2/m2): 0.83  E/E' av.6  Lateral E/e': 14.6  Medial E/e': 14.6     ______________________________________________________________________________  Report approved by: Danuta Marina 2022 11:09 AM

## 2022-08-03 NOTE — PROGRESS NOTES
Care Management Discharge Note    Discharge Date: 08/03/2022     Discharge Disposition: Skilled Nursing Facility, Hospice, Long Term Care    [facility]  [address] [place] [address]    Discharge Services: Transportation Services    [facility]  [address] [place] [address]  [phone]    Discharge DME: None    Discharge Transportation:  Stretcher Transport at 6:30 today. Patient in need of stretcher transport secondary to hospice enrollment, Alzheimer's Dementia and need for supervision for safety. PCS form completed, faxed to  and provided to [facility].     Private pay costs discussed: Not applicable    PAS Confirmation Code: [id number]  Patient/family educated on Medicare website which has current facility and service quality ratings: no    Education Provided on the Discharge Plan:    Persons Notified of Discharge Plans: Patient's daughter Laurence  Patient/Family in Agreement with the Plan: yes    Handoff Referral Completed: No    Additional Information:  SW received call from Brit with [facility] and they have a shared room available for patient today.  SW placed call to patient's daughter Laurence who stated she wanted to talk with family.  SW received call back from MOISE Vora who stated that they would like to see if patient could get a private room at [facility], but if not, they are in agreement with placement at [facility].  SW spoke with Arlette who stated they had not been able to review patient's chart and would not be able to accept today. SW spoke with family and they are in agreement with confirming bed with [facility]. SW confirmed bed and arranged for transport with  Transport at 6:30 tonight. Updated [facility] on discharge.  [facility] unable to meet with patient tonight but will be at the facility tomorrow. JAMIL confirmed with the facility and they are in agreement with hospice enrollment tomorrow. JAMIL updated physician regarding discharge  orders.  Facility requesting Covid Booster for patient. SW discussed with daughter Laurence and she is in agreement with Covid booster. Updated physician.  Received discharge orders and faxed to facility and hospice.     KENNEDY Viera, LGSW  [phone]  [facility]

## 2022-08-03 NOTE — PLAN OF CARE
7a-3p Nursing shift  Pt slept in this am. Awakened own and remained awake rest of shift.  Has advanced dementia and is pleasantly confused. Converses and answers questions when asked but remains disorientated to place, time and situation. Full assessments deferred due to Comfort Cares status. Denies any pain. Incontinent of B&B, no BM. Blanchable redness to coccyx, mepilex in place. T/R q2h. Up A1 with GB+W. On regular diet. Hospitalist deemed pt medically stable for discharge  Plan for transfer later today to LTC for Hospice care. Daughter Laurence aware. SW following

## 2022-08-08 NOTE — PROGRESS NOTES
[facility] GERIATRICS    PRIMARY CARE PROVIDER AND CLINIC:  TIFFANIE Fairchild CNP, [address] / [place] [address]  Chief Complaint   Patient presents with     [facility] Medical Record Number:  [id number]  Place of Service where encounter took place:  [facility]  () [[id number]]    Zahra Pulido  is a 76 year old  (1945) with a history of cognitive impairment, alcohol use disorder, admitted to the above facility from  [facility]. Hospital stay 7/27/22 through 8/3/22.      HPI:  Hospitalized following a fall, found by friends confused and unable to get up.  She had not been taking her previously prescribed metoprolol, lisinopril.  Initially found to the ED with A. fib with RVR, K+ 2.2, lactate 2.8, INR 2.1.  UA was negative for acute infection. Slums demonstrated advanced dementia, 1/30.  CT head, TSH, B12, ammonia all within normal.  EEG demonstrated generalized continuous slowing without seizure activity.  She did have some mild improvement with IV fluids, eating and drinking.  Due to her severe cognitive impairment and generalized failure to thrive, goals of care discussions were initiated, ultimately transition to comfort care with plans to enroll in hospice.      Seen today in her room in long-term care, resting in bed late morning.  She awakens briefly to speech, tells me she is tired and has no acute concerns.  She has been up on the unit in her wheelchair periodically.  No acute concerns from nursing.  [facility]    CODE STATUS/ADVANCE DIRECTIVES DISCUSSION:  No CPR- Do NOT Intubate  DNR / DNI  ALLERGIES:   Allergies   Allergen Reactions     Penicillins Anaphylaxis     Face became swollen in her teenage years when she took PCN      PAST MEDICAL HISTORY:   Past Medical History:   Diagnosis Date     Esophageal reflux      Long term (current) use of anticoagulants      Need for prophylactic hormone  replacement therapy (postmenopausal)     EVISTA     Phlebitis and thrombophlebitis of other deep vessels of lower extremities 2005     Rosacea      Unspecified essential hypertension       PAST SURGICAL HISTORY:   has a past surgical history that includes TOTAL KNEE ARTHROPLASTY (2005).  FAMILY HISTORY: family history includes Arthritis in her mother; Cancer in her father.  SOCIAL HISTORY:   reports that she quit smoking about 54 years ago. Her smoking use included cigarettes. She has a 6.00 pack-year smoking history. She has never used smokeless tobacco. She reports current alcohol use. She reports that she does not use drugs.  Patient's living condition: lives alone    Post Discharge Medication Reconciliation Status: Post Medication Reconciliation Status:         Current Outpatient Medications   Medication Sig     acetaminophen (TYLENOL) 325 MG tablet Take 2 tablets (650 mg) by mouth every 6 hours as needed for mild pain or other (and adjunct with moderate or severe pain or per patient request)     atropine 1 % ophthalmic solution Take 1 drop by mouth, place under tongue or place inside cheek every 4 hours as needed for secretions     HYDROmorphone (DILAUDID) 2 MG tablet Take 0.5 tablets (1 mg) by mouth or place under tongue every 2 hours as needed for pain or shortness of breath / dyspnea Hospice patient. Dispense in quantities of 30 tablets.     LORazepam (ATIVAN) 0.5 MG tablet Take 1 tablet (0.5 mg) by mouth or place under tongue every 4 hours as needed for anxiety (restlessness)     metoprolol tartrate (LOPRESSOR) 25 MG tablet Take 1 tablet (25 mg) by mouth 2 times daily     ondansetron (ZOFRAN ODT) 4 MG ODT tab Take 1 tablet (4 mg) by mouth every 6 hours as needed for nausea or vomiting     pantoprazole (PROTONIX) 40 MG EC tablet Take 1 tablet (40 mg) by mouth every morning (before breakfast)     senna-docusate (SENOKOT-S/PERICOLACE) 8.6-50 MG tablet Take 1 tablet by mouth 2 times daily as needed for  "constipation     thiamine (B-1) 100 MG tablet Take 1 tablet (100 mg) by mouth daily     No current facility-administered medications for this visit.       ROS:  Unobtainable secondary to cognitive impairment.     Vitals:  /76   Pulse 80   Temp 97.8  F (36.6  C)   Resp 18   Ht 1.676 m (5' 6\")   Wt 66 kg (145 lb 9.6 oz)   SpO2 98%   BMI 23.50 kg/m    Exam:  GENERAL APPEARANCE:  in no distress  RESP:  respiratory effort and palpation of chest normal, lungs clear to auscultation , no respiratory distress  CV:  Palpation and auscultation of heart done , regular rate and rhythm, no murmur, rub, or gallop, peripheral edema Trace+ in BLE  ABDOMEN:  normal bowel sounds, soft, nontender, no hepatosplenomegaly or other masses  M/S:   Gait and station abnormal Transfers with assist, puddles wheelchair for mobility  SKIN:  Inspection of skin and subcutaneous tissue baseline, Palpation of skin and subcutaneous tissue baseline  NEURO:   Cranial nerves 2-12 are normal tested and grossly at patient's baseline, SEARS freely  PSYCH:  memory impaired , affect and mood normal    Lab/Diagnostic data:  Labs done in SNF are in [place] [facility]. Please refer to them using [facility]/Care Everywhere., Recent labs in EPIC reviewed by me today.  and Patient is on hospice/palliative care and labs are not recommended    ASSESSMENT/PLAN:    (F03.90) Dementia without behavioral disturbance, unspecified dementia type (H)  (primary encounter diagnosis)  (Z51.5) hospice care patient  (R62.7) failure to thrive in adult  Comment: Advanced, low functional status at baseline.  Slums 1/30 inpatient.  Likely secondary to chronic alcohol use and/or Alzheimer's.  Likely component of vascular dementia in context of her A. fib history.  Now on hospice, appreciate their involvement.  - CT head without acute changes  - TSH, B12, ammonia WNL  - EEG with generalized continuous slowing  - B1 low at 32, likely secondary to EtOH use, malnutrition  Plan: SNF for " assist with ADLs, medication management, meals, activities.  Continue thiamine replacement.  Comfort medications [facility].    (I48.91) Atrial fibrillation with RVR (H)  (I10) Benign essential hypertension  Comment: Heart rate 120s in ED, has been off previously prescribed metoprolol, lisinopril without history of arrhythmia.  Converted to NSR 7/28 diltiazem drip.  Resumed PTA metoprolol, anticoagulation deferred per goals of care.  - EF 55%, no valve disease.  Plan: Metoprolol 25 mg twice daily, monitor heart rate per facility protocol    (E43) Severe protein-calorie malnutrition (H)  (E87.6) Hypokalemia  Comment: Multifactorial malnutrition in the setting of advanced dementia, poor oral intake, alcohol use  - Potassium 2.2 on admission, improved at discharge  - Thiamine level, on oral replacement  - INR elevated 2.2 2/2 poor dietary intake, no evidence of liver disease.  Received vitamin K x3  Plan: Regular diet, offer food preferences, RD to follow.      (E78.5) Hyperlipidemia LDL goal <130  Comment: PTA statin discontinued secondary to goals of care  Plan: Regular diet    (K21.9) Gastroesophageal reflux disease, unspecified whether esophagitis present  Comment: Chronic.  Plan: Monitor for symptoms.  Pantoprazole 40 mg daily      Total time spent with patient visit at the skilled nursing facility was 36 min including patient visit and review of past records. Greater than 50% of total time spent with counseling and coordinating care due to discussion with nursing regarding admission orders,  regarding hospice admission, discussion with patient regarding plan of care as outlined above and what to expect on admission to long-term care.     Electronically signed by:  TIFFANIE Fairchild CNP

## 2022-08-09 NOTE — LETTER
8/8/2022        RE: Zahra Pulido  [address]  [place] [address]        [facility] GERIATRICS    PRIMARY CARE PROVIDER AND CLINIC:  TIFFANIE Fairchild CNP, [address] / [place] [address]  Chief Complaint   Patient presents with     [facility] Medical Record Number:  [id number]  Place of Service where encounter took place:  [facility]  () [[id number]]    Zahra Pulido  is a 76 year old  (1945) with a history of cognitive impairment, alcohol use disorder, admitted to the above facility from  [facility]. Hospital stay 7/27/22 through 8/3/22.      HPI:  Hospitalized following a fall, found by friends confused and unable to get up.  She had not been taking her previously prescribed metoprolol, lisinopril.  Initially found to the ED with A. fib with RVR, K+ 2.2, lactate 2.8, INR 2.1.  UA was negative for acute infection. Slums demonstrated advanced dementia, 1/30.  CT head, TSH, B12, ammonia all within normal.  EEG demonstrated generalized continuous slowing without seizure activity.  She did have some mild improvement with IV fluids, eating and drinking.  Due to her severe cognitive impairment and generalized failure to thrive, goals of care discussions were initiated, ultimately transition to comfort care with plans to enroll in hospice.      Seen today in her room in long-term care, resting in bed late morning.  She awakens briefly to speech, tells me she is tired and has no acute concerns.  She has been up on the unit in her wheelchair periodically.  No acute concerns from nursing.  She has admitted to [facility] hospice.    CODE STATUS/ADVANCE DIRECTIVES DISCUSSION:  No CPR- Do NOT Intubate  DNR / DNI  ALLERGIES:   Allergies   Allergen Reactions     Penicillins Anaphylaxis     Face became swollen in her teenage years when she took PCN      PAST MEDICAL HISTORY:   Past Medical History:   Diagnosis Date     Esophageal  reflux      Long term (current) use of anticoagulants      Need for prophylactic hormone replacement therapy (postmenopausal)     EVISTA     Phlebitis and thrombophlebitis of other deep vessels of lower extremities 2005     Rosacea      Unspecified essential hypertension       PAST SURGICAL HISTORY:   has a past surgical history that includes TOTAL KNEE ARTHROPLASTY (2005).  FAMILY HISTORY: family history includes Arthritis in her mother; Cancer in her father.  SOCIAL HISTORY:   reports that she quit smoking about 54 years ago. Her smoking use included cigarettes. She has a 6.00 pack-year smoking history. She has never used smokeless tobacco. She reports current alcohol use. She reports that she does not use drugs.  Patient's living condition: lives alone    Post Discharge Medication Reconciliation Status: Post Medication Reconciliation Status:         Current Outpatient Medications   Medication Sig     acetaminophen (TYLENOL) 325 MG tablet Take 2 tablets (650 mg) by mouth every 6 hours as needed for mild pain or other (and adjunct with moderate or severe pain or per patient request)     atropine 1 % ophthalmic solution Take 1 drop by mouth, place under tongue or place inside cheek every 4 hours as needed for secretions     HYDROmorphone (DILAUDID) 2 MG tablet Take 0.5 tablets (1 mg) by mouth or place under tongue every 2 hours as needed for pain or shortness of breath / dyspnea Hospice patient. Dispense in quantities of 30 tablets.     LORazepam (ATIVAN) 0.5 MG tablet Take 1 tablet (0.5 mg) by mouth or place under tongue every 4 hours as needed for anxiety (restlessness)     metoprolol tartrate (LOPRESSOR) 25 MG tablet Take 1 tablet (25 mg) by mouth 2 times daily     ondansetron (ZOFRAN ODT) 4 MG ODT tab Take 1 tablet (4 mg) by mouth every 6 hours as needed for nausea or vomiting     pantoprazole (PROTONIX) 40 MG EC tablet Take 1 tablet (40 mg) by mouth every morning (before breakfast)     senna-docusate  "(SENOKOT-S/PERICOLACE) 8.6-50 MG tablet Take 1 tablet by mouth 2 times daily as needed for constipation     thiamine (B-1) 100 MG tablet Take 1 tablet (100 mg) by mouth daily     No current facility-administered medications for this visit.       ROS:  Unobtainable secondary to cognitive impairment.     Vitals:  /76   Pulse 80   Temp 97.8  F (36.6  C)   Resp 18   Ht 1.676 m (5' 6\")   Wt 66 kg (145 lb 9.6 oz)   SpO2 98%   BMI 23.50 kg/m    Exam:  GENERAL APPEARANCE:  in no distress  RESP:  respiratory effort and palpation of chest normal, lungs clear to auscultation , no respiratory distress  CV:  Palpation and auscultation of heart done , regular rate and rhythm, no murmur, rub, or gallop, peripheral edema Trace+ in BLE  ABDOMEN:  normal bowel sounds, soft, nontender, no hepatosplenomegaly or other masses  M/S:   Gait and station abnormal Transfers with assist, puddles wheelchair for mobility  SKIN:  Inspection of skin and subcutaneous tissue baseline, Palpation of skin and subcutaneous tissue baseline  NEURO:   Cranial nerves 2-12 are normal tested and grossly at patient's baseline, SEARS freely  PSYCH:  memory impaired , affect and mood normal    Lab/Diagnostic data:  Labs done in SNF are in [place] [email or web address]. Please refer to them using [facility]/Care Everywhere., Recent labs in EPIC reviewed by me today.  and Patient is on hospice/palliative care and labs are not recommended    ASSESSMENT/PLAN:    (F03.90) Dementia without behavioral disturbance, unspecified dementia type (H)  (primary encounter diagnosis)  (Z51.5) hospice care patient  (R62.7) failure to thrive in adult  Comment: Advanced, low functional status at baseline.  Slums 1/30 inpatient.  Likely secondary to chronic alcohol use and/or Alzheimer's.  Likely component of vascular dementia in context of her A. fib history.  Now on hospice, appreciate their involvement.  - CT head without acute changes  - TSH, B12, ammonia WNL  - EEG with generalized " continuous slowing  - B1 low at 32, likely secondary to EtOH use, malnutrition  Plan: SNF for assist with ADLs, medication management, meals, activities.  Continue thiamine replacement.  Comfort medications [facility].    (I48.91) Atrial fibrillation with RVR (H)  (I10) Benign essential hypertension  Comment: Heart rate 120s in ED, has been off previously prescribed metoprolol, lisinopril without history of arrhythmia.  Converted to NSR 7/28 diltiazem drip.  Resumed PTA metoprolol, anticoagulation deferred per goals of care.  - EF 55%, no valve disease.  Plan: Metoprolol 25 mg twice daily, monitor heart rate per facility protocol    (E43) Severe protein-calorie malnutrition (H)  (E87.6) Hypokalemia  Comment: Multifactorial malnutrition in the setting of advanced dementia, poor oral intake, alcohol use  - Potassium 2.2 on admission, improved at discharge  - Thiamine level, on oral replacement  - INR elevated 2.2 2/2 poor dietary intake, no evidence of liver disease.  Received vitamin K x3  Plan: Regular diet, offer food preferences, RD to follow.      (E78.5) Hyperlipidemia LDL goal <130  Comment: PTA statin discontinued secondary to goals of care  Plan: Regular diet    (K21.9) Gastroesophageal reflux disease, unspecified whether esophagitis present  Comment: Chronic.  Plan: Monitor for symptoms.  Pantoprazole 40 mg daily      Total time spent with patient visit at the skilled nursing facility was 36 min including patient visit and review of past records. Greater than 50% of total time spent with counseling and coordinating care due to discussion with nursing regarding admission orders,  regarding hospice admission, discussion with patient regarding plan of care as outlined above and what to expect on admission to long-term care.     Electronically signed by:  TIFFANIE Fairchild CNP                       Sincerely,        TIFFANIE Fairchild CNP

## 2022-08-16 NOTE — ED NOTES
Spoke to RN staff at pt's facility, they verbalize understanding of follow up care. They are ready for pt to return to facility
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
## 2022-08-16 NOTE — ED NOTES
Emergency Department Technician Wound Irrigation Note:    8/15/2022    10:44 PM      Wound location:  Left Lower Extremity     Irrigation Fluid: Normal Saline    Estimated Irrigation Volume (60 mL fluid per cm): 1000 ml    Kiran Self

## 2022-08-16 NOTE — PATIENT INSTRUCTIONS
Solomon Pulido  1945     Discontinue PRN tylenol  Tylenol 1 G TID dx: pain  Daily dressing change and wound assessment: leave steri strips in place, cleanse  gently with wound cleanser/NS, cover with non-adherant dressing, secure with ace bandage. May use ABD PRN for drainage. OK to leave CRYSTAL once sutures removed if no drainage.       TIFFANIE Fairchild CNP on 8/16/2022 at 1:37 PM

## 2022-08-16 NOTE — DISCHARGE INSTRUCTIONS
Sutures need to be removed in 14 days.    Discharge Instructions  Laceration (Cut)    You were seen today for a laceration (cut).  Your provider examined your laceration for any problems such a buried foreign body (like glass, a splinter, or gravel), or injury to blood vessels, tendons, and nerves.  Your provider may have also rinsed and/or scrubbed your laceration to help prevent an infection. It may not be possible to find all problems with your laceration on the first visit; occasionally foreign bodies or a tendon injury can go undetected.    Your laceration may have been closed in one of several ways:  No closure: many wounds will heal just fine without closure.  Stitches: regular stitches that require removal.  Staples: skin staples are often used in the scalp/head.  Wound adhesive (glue): skin glue can be used for certain lacerations and doesn t require removal.  Wound strips (aka Butterfly bandages or steri-strips): these are bandages that help to close a wound.  Absorbable stitches:  dissolving  stitches that go away on their own and usually don t require removal.    A small percentage of wounds will develop an infection regardless of how well the wound is cared for. Antibiotics are generally not indicated to prevent an infection so are only given for a small number of high-risk wounds. Some lacerations are too high risk to close, and are left open to heal because closure can increase the likelihood that an infection will develop.    Remember that all lacerations, no matter how expertly repaired, will cause scarring. We consider many factors, techniques, and materials, in our efforts to provide the best possible cosmetic outcome.    Generally, every Emergency Department visit should have a follow-up clinic visit with either a primary or a specialty clinic/provider. Please follow-up as instructed by your emergency provider today.     Return to the Emergency Department right away if:  You have more redness,  swelling, pain, drainage (pus), a bad smell, or red streaking from your laceration as these symptoms could indicate an infection.  You have a fever of 100.4 F or more.  You have bleeding that you cannot stop at home. If your cut starts to bleed, hold pressure on the bleeding area with a clean cloth or put pressure over the bandage.  If the bleeding does not stop after using constant pressure for 30 minutes, you should return to the Emergency Department for further treatment.  An area past the laceration is cool, pale, or blue compared with the other side, or has a slower return of color when squeezed.  Your dressing seems too tight or starts to get uncomfortable or painful. For children, signs of a problem might be irritability or restlessness.  You have loss of normal function or use of an area, such as being unable to straighten or bend a finger normally.  You have a numb area past the laceration.    Return to the Emergency Department or see your regular provider if:  The laceration starts to come open.   You have something coming out of the cut or a feeling that there is something in the laceration.  Your wound will not heal, or keeps breaking open. There can always be glass, wood, dirt or other things in any wound.  They will not always show up, even on x-rays.  If a wound does not heal, this may be why, and it is important to follow-up with your regular provider.    Home Care:  Take your dressing off in 12-24 hours, or as instructed by your provider, to check your laceration. Remove the dressing sooner if it seems too tight or painful, or if it is getting numb, tingly, or pale past the dressing.  Gently wash your laceration 1-2 times daily with clean water and mild soap. It is okay to shower or run clean water over the laceration, but do not let the laceration soak in water (no swimming).  If your laceration was closed with wound adhesive or strips: pat it dry and leave it open to the air. For all other  repairs: after you wash your laceration, or at least 2 times a day, apply antibiotic ointment (such as Neosporin  or Bacitracin ) to the laceration, then cover it with a Band-Aid  or gauze.  Keep the laceration clean. Wear gloves or other protective clothing if you are around dirt.    Follow-up for removal:  If your wound was closed with staples or regular stitches, they need to be removed according to the instructions and timeline specified by your provider today.  If your wound was closed with absorbable ( dissolving ) sutures, they should fall out, dissolve, or not be visible in about one week. If they are still visible, then they should be removed according to the instructions and timeline specified by your provider today.    Scars:  To help minimize scarring:  Wear sunscreen over the healed laceration when out in the sun.  Massage the area regularly once healed.  You may apply Vitamin E to the healed wound.  Wait. Scars improve in appearance over months and years.    If you were given a prescription for medicine here today, be sure to read all of the information (including the package insert) that comes with your prescription.  This will include important information about the medicine, its side effects, and any warnings that you need to know about.  The pharmacist who fills the prescription can provide more information and answer questions you may have about the medicine.  If you have questions or concerns that the pharmacist cannot address, please call or return to the Emergency Department.       Remember that you can always come back to the Emergency Department if you are not able to see your regular provider in the amount of time listed above, if you get any new symptoms, or if there is anything that worries you.
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
## 2022-09-06 NOTE — PATIENT INSTRUCTIONS
Solomon Pulido  1945     Keflex 500 mg QID x 3 days dx: cellulitis      TIFFANIE Fairchild CNP on 9/6/2022 at 2:39 PM
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
## 2022-09-09 NOTE — LETTER
"    9/9/2022        RE: Zahra Pulido  [address]  [place] [address]        [facility]    Chief Complaint   Patient presents with     Nursing Home Acute     HPI:  Zahra Pulido is a 76 year old  (1945), who is being seen today for an episodic care visit at: [facility]  () [[id number]].     Today's concern is: Seen today for complaint of left leg and hip pain following an unwitnessed mechanical fall yesterday 9/8. PPX was here this morning and reported a pelvic fracture, final report not yet available. Seen today in her room in LT, history limited by dementia. She states \"I know, I know\" frequently, denying pain but grimacing throughout the visit. She is lying on the edge of the bed and has difficulty repositioning herself.     Allergies, and PMH/PSH reviewed in EPIC today.  REVIEW OF SYSTEMS:  Unobtainable secondary to cognitive impairment.     Objective:   BP (!) 144/77   Pulse 79   Temp 97.8  F (36.6  C)   Resp 18   Ht 1.676 m (5' 6\")   Wt 73.7 kg (162 lb 6.4 oz)   SpO2 95%   BMI 26.21 kg/m    GENERAL APPEARANCE:  Alert, in no distress, frequent non-verbal signs of pain  RESP:  respiratory effort and palpation of chest normal, lungs clear to auscultation , no respiratory distress  CV:  Palpation and auscultation of heart done , regular rate and rhythm, no murmur, rub, or gallop, no edema  M/S:   Gait and station abnormal LLE externally rotated lying in bed, mobility limited by pain  SKIN:  LLE laceration without redness or edema  PSYCH:  memory impaired , affect and mood normal    Labs done in SNF are in [facility]. Please refer to them using [employer]/Care Everywhere. and Recent labs in [facility] reviewed by me today.     Assessment/Plan:  (S32.9XXA) Closed nondisplaced fracture of pelvis, unspecified part of pelvis, initial encounter (H)  (primary encounter diagnosis)  Comment: acute, following mechanical fall. Per goals of care will not treat. " Requested nursing request a nurse visit from hospice to ensure adequate pain regimen.   -Discussed with DON who will follow facility reporting measures. Consider concave mattress for comfort/positioning.   -Discussed with bedside RN, requested PRN pain medication be administered during visit.  Plan: Comfort focus, hydromorphone, morphine available PRN. Start non-verbal pain monitoring and offer PRNs as indicated.     (L03.116) Cellulitis of left lower extremity  (S81.812D) Laceration of left lower extremity, subsequent encounter  Comment: Now healing without signs of infection  -completed 8 days of keflex  Plan: Local treatment per nursing as directed by wound MD with weekly rounding. Routine skin monitoring per nursing.     (R52) Pain  (F03.90) Dementia without behavioral disturbance, unspecified dementia type (H)  Comment: Pain secondary to the above with inappropriate pain reporting due to underlying dementia.   Plan: Pain management as above, follow up with hospice nurse, non-verbal pain monitoring per nursing.      Electronically signed by: TIFFANIE Fairchild CNP             Sincerely,        TIFFANIE Fairchild CNP

## 2022-09-09 NOTE — PROGRESS NOTES
"[facility] GERIATRICS    Chief Complaint   Patient presents with     Nursing Home Acute     HPI:  Zahra Pulido is a 76 year old  (1945), who is being seen today for an episodic care visit at: [facility]  () [[id number]].     Today's concern is: Seen today for complaint of left leg and hip pain following an unwitnessed mechanical fall yesterday 9/8. PPX was here this morning and reported a left intertrochanteric hip fracture, final report not yet available. Seen today in her room in LTC, history limited by dementia. She states \"I know, I know\" frequently, denying pain but grimacing throughout the visit. She is lying on the edge of the bed and has difficulty repositioning herself.     Allergies, and PMH/PSH reviewed in EPIC today.  REVIEW OF SYSTEMS:  Unobtainable secondary to cognitive impairment.     Objective:   BP (!) 144/77   Pulse 79   Temp 97.8  F (36.6  C)   Resp 18   Ht 1.676 m (5' 6\")   Wt 73.7 kg (162 lb 6.4 oz)   SpO2 95%   BMI 26.21 kg/m    GENERAL APPEARANCE:  Alert, in no distress, frequent non-verbal signs of pain  RESP:  respiratory effort and palpation of chest normal, lungs clear to auscultation , no respiratory distress  CV:  Palpation and auscultation of heart done , regular rate and rhythm, no murmur, rub, or gallop, no edema  M/S:   Gait and station abnormal LLE externally rotated lying in bed, mobility limited by pain  SKIN:  LLE laceration without redness or edema  PSYCH:  memory impaired , affect and mood normal    Labs done in SNF are in [facility]. Please refer to them using [employer]/Care Everywhere. and Recent labs in [facility] reviewed by me today.     Assessment/Plan:  (S72.002A) Closed fracture of left hip, initial encounter (H)  Comment: acute, following mechanical fall. Per goals of care will not treat. Requested nursing request a nurse visit from hospice to ensure adequate pain regimen.   -Discussed with DON who will follow facility reporting measures. " Consider concave mattress for comfort/positioning.   -Discussed with bedside RN, requested PRN pain medication be administered during visit.  Plan: Comfort focus, hydromorphone, morphine available PRN. Start non-verbal pain monitoring and offer PRNs as indicated.     (L03.116) Cellulitis of left lower extremity  (S81.812D) Laceration of left lower extremity, subsequent encounter  Comment: Now healing without signs of infection  -completed 8 days of keflex  Plan: Local treatment per nursing as directed by wound MD with weekly rounding. Routine skin monitoring per nursing.     (R52) Pain  (F03.90) Dementia without behavioral disturbance, unspecified dementia type (H)  Comment: Pain secondary to the above with inappropriate pain reporting due to underlying dementia.   Plan: Pain management as above, follow up with hospice nurse, non-verbal pain monitoring per nursing.      Electronically signed by: TIFFANIE Fairchild CNP

## 2022-09-23 NOTE — LETTER
9/23/2022        RE: Zahra Pulido  [address]  [place] [address]        No notes on file      Sincerely,        Alice Morales MD

## 2022-10-10 NOTE — PROGRESS NOTES
Zahra Pulido is a 76 year old female seen September 23, 2022 at [facility] where she has resided for one month (admit 8/2022) seen for initial visit.   Pt is seen in her room resting abed, some limited history but mostly word salad.   She says no to ?any pain, but is very sensitive to touch everywhere, especially LLE.    By chart review, pt has been living at home alone without services until she was found down by some friends.   Had not been taking her medications for atrial fib and hypertension.    No reversible causes found on hospital workup.  Noted to have dementia related to Alzheimer's vs alcohol use disorder vs vascular causes.   Due to ongoing failure to thrive she was transitioned to comfort care and discharged to LTC on [facility]     Pt had an 8/15 ED visit for a LLE laceration, apparently colliding with another resident.   15cm laceration required two layers of sutures.   Then became infected and was treated with cephalexin, followed by the Wound team.   Now healing.     Pt had an unwitnessed fall earlier this month, self transferred out of bed.   She suffered a left intertrochanteric hip fracture, continues on Hospice with comfort focus.   Pain regimen increased and she has been bed bound since then.    LLE is shortened and externally rotated.      Past Medical History:   Diagnosis Date     Esophageal reflux      Long term (current) use of anticoagulants      Need for prophylactic hormone replacement therapy (postmenopausal)     EVISTA     Phlebitis and thrombophlebitis of other deep vessels of lower extremities 2005     Rosacea      Unspecified essential hypertension        Past Surgical History:   Procedure Laterality Date     ZC TOTAL KNEE ARTHROPLASTY  2005    right       Family History   Problem Relation Age of Onset     Arthritis Mother         rheumatoid arthritis     Cancer Father         pancreatic       Social History     Tobacco Use     Smoking status: Former      "Packs/day: 1.50     Years: 4.00     Pack years: 6.00     Types: Cigarettes     Quit date: 1968     Years since quittin.1     Smokeless tobacco: Never     Tobacco comments:     quit 35 years ago   Substance Use Topics     Alcohol use: Yes     Alcohol/week: 0.0 standard drinks     Comment: beer on the weekend      SH:  Previously lived [place] apartment [place] services   Support from family and friends for ADLs.   , she has a daughter Laurence and son Servando.     ROS:  Unable to obtain secondary to dementia and loss of language   SLUMS    Wt Readings from Last 5 Encounters:   22 72.6 kg (160 lb)   22 73.7 kg (162 lb 6.4 oz)   22 70.1 kg (154 lb 9.6 oz)   22 69.5 kg (153 lb 3.2 oz)   22 66 kg (145 lb 9.6 oz)      EXAM: frail, NAD  /68   Pulse 80   Temp 97.7  F (36.5  C)   Resp 16   Ht 1.676 m (5' 6\")   Wt 72.6 kg (160 lb)   SpO2 91%   BMI 25.82 kg/m     Poor dentition  Neck supple without adenopathy  Lungs with decreased BS, no rales or wheeze  Heart RRR s1s2  Abd soft, NT, no distention or guarding, +BS  Ext without edema   LLE wound with Mepilex dressing, no surrounding inflammation, scant drainage   LLE shortened and externally rotated  Tender to touch.   Neuro: word salad, bed bound    Last Comprehensive Metabolic Panel:  Sodium   Date Value Ref Range Status   2022 135 133 - 144 mmol/L Final     Potassium   Date Value Ref Range Status   2022 3.8 3.4 - 5.3 mmol/L Final     Carbon Dioxide (CO2)   Date Value Ref Range Status   2022 24 20 - 32 mmol/L Final     Glucose   Date Value Ref Range Status   2022 85 70 - 99 mg/dL Final     Urea Nitrogen   Date Value Ref Range Status   2022 11 7 - 30 mg/dL Final     Creatinine   Date Value Ref Range Status   2022 0.62 0.52 - 1.04 mg/dL Final     GFR Estimate   Date Value Ref Range Status   2022 >90 >60 mL/min/1.73m2 Final     Calcium   Date Value Ref Range Status   2022 " 8.0 (L) 8.5 - 10.1 mg/dL Final     Lab Results   Component Value Date    WBC 7.1 07/30/2022      HGB 11.2 07/30/2022      MCV 89 07/30/2022       07/30/2022     TSH   Date Value Ref Range Status   07/27/2022 0.64 0.40 - 4.00 mU/L Final   02/01/2020 2.18 0.40 - 4.00 mU/L Final           IMP/PLAN:   (S72.002D) Closed left hip fracture, with routine healing, subsequent encounter    Comment: occurred during self-transfer attempt two weeks ago     Plan: Pain management with acetaminophen 1000 mg tid, hydromorphone 1 mg q2 hours PRN or MS solutab 5 mg q4 hours PRN     (S81.812D) Laceration of left lower extremity, subsequent encounter  Comment: cellulitis has resolved    Plan: routine wound care and cover with Mepilex dressing per Hospice nursing       (G30.9,  F01.50,  F02.80) Mixed dementia (H)  Comment: Alzheimer's vs alcohol use disorder vs vascular dementia   Plan: LTC support for med admin, meals, activity and all cares         (I48.0) PAF (paroxysmal atrial fibrillation) (H)  Comment:   Pulse Readings from Last 4 Encounters:   09/22/22 80   09/09/22 79   09/06/22 79   08/16/22 80      Plan: metoprolol 25 mg bid for VR control   Not anticoagulated secondary to goals of care.     (R62.7) Failure to thrive in adult  (E43) Severe protein-calorie malnutrition (H)  Comment: has gained some weight with regular meals in LTC setting     Plan: food preferences, supportive care     (Z51.5) Hospice care patient  Comment: PRNs available   Plan: treat to comfort.  Pt is on [facility]        Alice Morales MD

## 2022-10-16 ENCOUNTER — HEALTH MAINTENANCE LETTER (OUTPATIENT)
Age: 77
End: 2022-10-16
